# Patient Record
Sex: MALE | Race: AMERICAN INDIAN OR ALASKA NATIVE | Employment: OTHER | URBAN - METROPOLITAN AREA
[De-identification: names, ages, dates, MRNs, and addresses within clinical notes are randomized per-mention and may not be internally consistent; named-entity substitution may affect disease eponyms.]

---

## 2018-07-18 ENCOUNTER — HOSPITAL ENCOUNTER (EMERGENCY)
Age: 58
Discharge: HOME | End: 2018-07-18
Attending: EMERGENCY MEDICINE
Payer: COMMERCIAL

## 2018-07-18 ENCOUNTER — APPOINTMENT (OUTPATIENT)
Dept: CT IMAGING | Age: 58
End: 2018-07-18
Attending: PHYSICIAN ASSISTANT
Payer: COMMERCIAL

## 2018-07-18 VITALS
HEART RATE: 70 BPM | SYSTOLIC BLOOD PRESSURE: 163 MMHG | DIASTOLIC BLOOD PRESSURE: 99 MMHG | WEIGHT: 191.36 LBS | HEIGHT: 69 IN | RESPIRATION RATE: 16 BRPM | TEMPERATURE: 98.8 F | OXYGEN SATURATION: 97 % | BODY MASS INDEX: 28.34 KG/M2

## 2018-07-18 DIAGNOSIS — R51.9 ACUTE INTRACTABLE HEADACHE, UNSPECIFIED HEADACHE TYPE: Primary | ICD-10-CM

## 2018-07-18 DIAGNOSIS — J06.9 ACUTE UPPER RESPIRATORY INFECTION: ICD-10-CM

## 2018-07-18 DIAGNOSIS — I10 ESSENTIAL HYPERTENSION: ICD-10-CM

## 2018-07-18 DIAGNOSIS — F17.200 TOBACCO DEPENDENCE: ICD-10-CM

## 2018-07-18 PROCEDURE — 74011250636 HC RX REV CODE- 250/636: Performed by: PHYSICIAN ASSISTANT

## 2018-07-18 PROCEDURE — 99283 EMERGENCY DEPT VISIT LOW MDM: CPT

## 2018-07-18 PROCEDURE — 96374 THER/PROPH/DIAG INJ IV PUSH: CPT

## 2018-07-18 PROCEDURE — 70450 CT HEAD/BRAIN W/O DYE: CPT

## 2018-07-18 PROCEDURE — 96375 TX/PRO/DX INJ NEW DRUG ADDON: CPT

## 2018-07-18 RX ORDER — KETOROLAC TROMETHAMINE 30 MG/ML
30 INJECTION, SOLUTION INTRAMUSCULAR; INTRAVENOUS
Status: COMPLETED | OUTPATIENT
Start: 2018-07-18 | End: 2018-07-18

## 2018-07-18 RX ORDER — PROCHLORPERAZINE EDISYLATE 5 MG/ML
10 INJECTION INTRAMUSCULAR; INTRAVENOUS
Status: COMPLETED | OUTPATIENT
Start: 2018-07-18 | End: 2018-07-18

## 2018-07-18 RX ORDER — DIPHENHYDRAMINE HYDROCHLORIDE 50 MG/ML
50 INJECTION, SOLUTION INTRAMUSCULAR; INTRAVENOUS
Status: COMPLETED | OUTPATIENT
Start: 2018-07-18 | End: 2018-07-18

## 2018-07-18 RX ORDER — BUTALBITAL, ACETAMINOPHEN AND CAFFEINE 300; 40; 50 MG/1; MG/1; MG/1
CAPSULE ORAL
Qty: 20 CAP | Refills: 0 | Status: ON HOLD | OUTPATIENT
Start: 2018-07-18 | End: 2021-08-20 | Stop reason: SINTOL

## 2018-07-18 RX ADMIN — DIPHENHYDRAMINE HYDROCHLORIDE 50 MG: 50 INJECTION, SOLUTION INTRAMUSCULAR; INTRAVENOUS at 19:42

## 2018-07-18 RX ADMIN — KETOROLAC TROMETHAMINE 30 MG: 30 INJECTION, SOLUTION INTRAMUSCULAR at 19:42

## 2018-07-18 RX ADMIN — PROCHLORPERAZINE EDISYLATE 10 MG: 5 INJECTION INTRAMUSCULAR; INTRAVENOUS at 19:42

## 2018-07-18 NOTE — ED NOTES
Patient to triage room to reassess VS and provide update to patient and family. Patient understanding at this time. VSS, will remain in waiting room.

## 2018-07-18 NOTE — LETTER
[address] 
[facility] EMERGENCY DEPT 
[address] [address] 
[phone] Work/School Note Date: 7/18/2018 To Whom It May concern: 
 
Ceferino Rossi was seen and treated today in the emergency room. He may return to work in 1 to 2 days, as symptoms improve. Sincerely, Solitario Milligan

## 2018-07-18 NOTE — ED TRIAGE NOTES
Pt with c/o of left temporal headache that migrates up to top of head for about 2 weeks. States that treated about 2 weeks ago by PMD for URI and is still taking abx. States not taking pain med. Wife at bedside. States occasionally pt talks like he has \"marbles in his mouth\". States that his speech is different, slower. Speech appears forced. PA at bedside.

## 2018-07-18 NOTE — ED PROVIDER NOTES
EMERGENCY DEPARTMENT HISTORY AND PHYSICAL EXAM      Date: 7/18/2018  Patient Name: Charles Zheng    History of Presenting Illness     Chief Complaint   Patient presents with    Headache     Pt ambulatory to triage with c/o headache, L-sided x 2 weeks; pt was seen at [address] [place] [address] on Friday for \"vomiting and headache\"; pt was referred by PCP for CT scan; pt states nausea, no vomiting; denies hx of migraines, vertigo       History Provided By: Patient and Patient's Wife    HPI: Charles Zheng, 60 y.[facility]. [facility] with his wife with c/o headache, N/V x 2 weeks. Pt states he was seen at [facility] on Friday, 7/13/18; however, symptoms persist.  Pt follow up with his PCP today who referred to the ED for CT scan. Pt denied head injury. No h/o chronic headaches or migraines. Pt without sinus pressure/congestion but c/o fullness to ears and is currently being treated for respiratory illness with Ceftin. Pt without neck pain or stiffness. No fever/chills. No rashes, insect bites/stings. Pt denied visual changes. Pt denied dizziness/vertigo however, wife reports he seems \"slow and wobbly\". Chief Complaint: headache, N/V  Duration: 2 Weeks  Timing:  Progressive  Location: L sided headache  Quality: Sharp  Severity: 10 out of 10  Modifying Factors: no exacerbating/alleviating features  Associated Symptoms: denies any other associated signs or symptoms        There are no other complaints, changes, or physical findings at this time.     PCP: Rita Chirinos MD    Current Facility-Administered Medications   Medication Dose Route Frequency Provider Last Rate Last Dose    prochlorperazine (COMPAZINE) injection 10 mg  10 mg IntraVENous NOW [facility] [place]        diphenhydrAMINE (BENADRYL) injection 50 mg  50 mg IntraVENous NOW [facility] [place]        ketorolac (TORADOL) injection 30 mg  30 mg IntraVENous NOW [facility]         Current Outpatient Prescriptions   Medication Sig Dispense Refill    olmesartan (BENICAR) 20 mg tablet Take 20 mg by mouth daily.  fexofenadine (ALLEGRA) 180 mg tablet Take 60 mg by mouth daily.  HYDROmorphone (DILAUDID) 2 mg tablet Take 2 mg by mouth every six (6) hours as needed.  ciprofloxacin (CIPRO) 500 mg tablet Take 500 mg by mouth two (2) times a day.  ibuprofen (MOTRIN) 200 mg tablet Take 400 mg by mouth every six (6) hours as needed.  potassium chloride (KLOR-CON M20) 20 mEq tablet Take 20 mEq by mouth two (2) times a day.  omeprazole (PRILOSEC) 40 mg capsule Take 40 mg by mouth daily. Past History     Past Medical History:  Past Medical History:   Diagnosis Date    Hypertension        Past Surgical History:  Past Surgical History:   Procedure Laterality Date    ABDOMEN SURGERY PROC UNLISTED      hernia       Family History:  Family History   Problem Relation Age of Onset    Heart Attack Mother        Social History:  Social History   Substance Use Topics    Smoking status: Current Every Day Smoker     Packs/day: 1.00    Smokeless tobacco: None    Alcohol use No       Allergies:  No Known Allergies      Review of Systems   Review of Systems   Constitutional: Negative for chills and fever. HENT: Positive for ear pain. Negative for congestion, postnasal drip, rhinorrhea, sinus pain, sinus pressure and sore throat. Eyes: Negative for photophobia and visual disturbance. Respiratory: Negative for cough and shortness of breath. Cardiovascular: Negative for chest pain and palpitations. Gastrointestinal: Negative for abdominal pain, diarrhea, nausea and vomiting. Endocrine: Negative for polydipsia, polyphagia and polyuria. Genitourinary: Negative for dysuria and hematuria. Musculoskeletal: Negative for neck pain and neck stiffness. Skin: Negative for rash and wound. Allergic/Immunologic: Negative for environmental allergies, food allergies and immunocompromised state.    Neurological: Negative for dizziness, facial asymmetry, speech difficulty, weakness, numbness and headaches. See HPI   Psychiatric/Behavioral: Negative for agitation and confusion. The patient is nervous/anxious. Physical Exam   Physical Exam   Constitutional: He is oriented to person, place, and time. He appears well-developed and well-nourished. No distress. WDWN male, alert, in moderate discomfort   HENT:   Head: Normocephalic and atraumatic. Mouth/Throat: No oropharyngeal exudate. Boggy nasal mucosa, clear rhinorrhea, posterior oropharynx injected without exudate. Increased effusion noted to bilat TMs, no erythema, good light reflex noted. Eyes: Conjunctivae and EOM are normal. Pupils are equal, round, and reactive to light. Right eye exhibits no discharge. Left eye exhibits no discharge. No scleral icterus. Neck: Normal range of motion. Neck supple. No JVD present. No tracheal deviation present. No thyromegaly present. No nuchal rigidity   Cardiovascular: Normal rate, regular rhythm and normal heart sounds. Pulmonary/Chest: Effort normal and breath sounds normal. No respiratory distress. He has no wheezes. He exhibits no tenderness. Abdominal: Soft. He exhibits no distension. There is no tenderness. There is no rebound and no guarding. Musculoskeletal: Normal range of motion. He exhibits no edema or tenderness.  5/5 bilat, pt observed to ambulate without deficit   Lymphadenopathy:     He has no cervical adenopathy. Neurological: He is alert and oriented to person, place, and time. He has normal reflexes. No cranial nerve deficit. He exhibits normal muscle tone. Coordination normal.   FNF intact, no pronator drift   Skin: Skin is warm and dry. No rash noted. He is not diaphoretic. No erythema. No pallor. Psychiatric: He has a normal mood and affect. His behavior is normal. Judgment normal.   Nursing note and vitals reviewed.       Diagnostic Study Results     Labs -   No results found for this or any previous visit (from the past 12 hour(s)). Radiologic Studies -   CT HEAD WO CONT    (Results Pending)        Abbie Pack #[id number]  (58 y.o. M) 25             Lab Results      None       Imaging Results           CT HEAD WO CONT (Final result) Result time: 07/18/18 19:28:06     Final result by Jarret Gallo Results In (07/18/18 19:27:54)     Initial Result:     Impression:     IMPRESSION: No acute findings.         Narrative:     EXAM:  CT HEAD WO CONT    INDICATION:   Headache    COMPARISON: None. CONTRAST:  None. TECHNIQUE: Unenhanced CT of the head was performed using 5 mm images. Brain and  bone windows were generated.  CT dose reduction was achieved through use of a  standardized protocol tailored for this examination and automatic exposure  control for dose modulation.      FINDINGS:  The ventricles and sulci are normal in size, shape and configuration and  midline. There is no significant white matter disease. There is no intracranial  hemorrhage, extra-axial collection, mass, mass effect or midline shift.  The  basilar cisterns are open. No acute infarct is identified. The bone windows  demonstrate no abnormalities. The visualized portions of the paranasal sinuses  and mastoid air cells are clear except for a small mucosal retention cyst  inferiorly in the left maxillary sinus. .                    Medical Decision Making   I am the first provider for this patient. I reviewed the vital signs, available nursing notes, past medical history, past surgical history, family history and social history. Vital Signs-Reviewed the patient's vital signs.   Patient Vitals for the past 12 hrs:   Temp Pulse Resp BP SpO2   07/18/18 1758 - 70 16 (!) 163/99 97 %   07/18/18 1553 98.8 °F (37.1 °C) 78 16 (!) 142/97 96 %       Records Reviewed: Nursing Notes, Old Medical Records and Previous Laboratory Studies    Provider Notes (Medical Decision Making):   Sinusitis, tension headache, temporal arteritis, [facility]    ED Course:   Initial assessment performed. The patients presenting problems have been discussed, and they are in agreement with the care plan formulated and outlined with them. I have encouraged them to ask questions as they arise throughout their visit. DISCHARGE NOTE:  1910  The care plan has been outline with the patient and/or family, who verbally conveyed understanding and agreement. Available results have been reviewed. Patient and/or family understand the follow up plan as outlined and discharge instructions. Should their condition deterioration at any time after discharge the patient agrees to return, follow up sooner than outlined or seek medical assistance at the closest Emergency Room as soon as possible. Questions have been answered. Discharge instructions and educational information regarding the patient's diagnosis as well a list of reasons why the patient would want to seek immediate medical attention, should their condition change, were reviewed directly with the patient/family       PLAN:  1. Discharge Medication List as of 7/18/2018  8:36 PM        2. Follow-up Information     Follow up With Details Comments 173 Chuck Hurtado MD   [address]  [phone]      [facility] EMERGENCY DEPT  If symptoms worsen [address] [facility] [address]  [phone]        Return to ED if worse     Diagnosis     Clinical Impression:   1. Acute intractable headache, unspecified headache type    2. Acute upper respiratory infection    3. Essential hypertension    4.  Tobacco dependence

## 2018-07-19 NOTE — DISCHARGE INSTRUCTIONS
Headache: Care Instructions  Your Care Instructions    Headaches have many possible causes. Most headaches aren't a sign of a more serious problem, and they will get better on their own. Home treatment may help you feel better faster. The doctor has checked you carefully, but problems can develop later. If you notice any problems or new symptoms, get medical treatment right away. Follow-up care is a key part of your treatment and safety. Be sure to make and go to all appointments, and call your doctor if you are having problems. It's also a good idea to know your test results and keep a list of the medicines you take. How can you care for yourself at home? · Do not drive if you have taken a prescription pain medicine. · Rest in a quiet, dark room until your headache is gone. Close your eyes and try to relax or go to sleep. Don't watch TV or read. · Put a cold, moist cloth or cold pack on the painful area for 10 to 20 minutes at a time. Put a thin cloth between the cold pack and your skin. · Use a warm, moist towel or a heating pad set on low to relax tight shoulder and neck muscles. · Have someone gently massage your neck and shoulders. · Take pain medicines exactly as directed. ¨ If the doctor gave you a prescription medicine for pain, take it as prescribed. ¨ If you are not taking a prescription pain medicine, ask your doctor if you can take an over-the-counter medicine. · Be careful not to take pain medicine more often than the instructions allow, because you may get worse or more frequent headaches when the medicine wears off. · Do not ignore new symptoms that occur with a headache, such as a fever, weakness or numbness, vision changes, or confusion. These may be signs of a more serious problem. To prevent headaches  · Keep a headache diary so you can figure out what triggers your headaches. Avoiding triggers may help you prevent headaches.  Record when each headache began, how long it lasted, and what the pain was like (throbbing, aching, stabbing, or dull). Write down any other symptoms you had with the headache, such as nausea, flashing lights or dark spots, or sensitivity to bright light or loud noise. Note if the headache occurred near your period. List anything that might have triggered the headache, such as certain foods (chocolate, cheese, wine) or odors, smoke, bright light, stress, or lack of sleep. · Find healthy ways to deal with stress. Headaches are most common during or right after stressful times. Take time to relax before and after you do something that has caused a headache in the past.  · Try to keep your muscles relaxed by keeping good posture. Check your jaw, face, neck, and shoulder muscles for tension, and try relaxing them. When sitting at a desk, change positions often, and stretch for 30 seconds each hour. · Get plenty of sleep and exercise. · Eat regularly and well. Long periods without food can trigger a headache. · Treat yourself to a massage. Some people find that regular massages are very helpful in relieving tension. · Limit caffeine by not drinking too much coffee, tea, or soda. But don't quit caffeine suddenly, because that can also give you headaches. · Reduce eyestrain from computers by blinking frequently and looking away from the computer screen every so often. Make sure you have proper eyewear and that your monitor is set up properly, about an arm's length away. · Seek help if you have depression or anxiety. Your headaches may be linked to these conditions. Treatment can both prevent headaches and help with symptoms of anxiety or depression. When should you call for help? Call 911 anytime you think you may need emergency care. For example, call if:    · You have signs of a stroke. These may include:  ¨ Sudden numbness, paralysis, or weakness in your face, arm, or leg, especially on only one side of your body. ¨ Sudden vision changes.   ¨ Sudden trouble speaking. ¨ Sudden confusion or trouble understanding simple statements. ¨ Sudden problems with walking or balance. ¨ A sudden, severe headache that is different from past headaches.    Call your doctor now or seek immediate medical care if:    · You have a new or worse headache.     · Your headache gets much worse. Where can you learn more? Go to http://juliana-juan jose.info/. Enter M271 in the search box to learn more about \"Headache: Care Instructions. \"  Current as of: October 9, 2017  Content Version: 11.7  © 4085-1533 [employer]. Care instructions adapted under license by [employer] (which disclaims liability or warranty for this information). If you have questions about a medical condition or this instruction, always ask your healthcare professional. [address] any warranty or liability for your use of this information. Learning About High Blood Pressure  What is high blood pressure? Blood pressure is a measure of how hard the blood pushes against the walls of your arteries. It's normal for blood pressure to go up and down throughout the day, but if it stays up, you have high blood pressure. Another name for high blood pressure is hypertension. Two numbers tell you your blood pressure. The first number is the systolic pressure. It shows how hard the blood pushes when your heart is pumping. The second number is the diastolic pressure. It shows how hard the blood pushes between heartbeats, when your heart is relaxed and filling with blood. A blood pressure of less than 120/80 (say \"120 over 80\") is ideal for an adult. High blood pressure is 130/80 or higher. You have high blood pressure if your top number is 130 or higher or your bottom number is 80 or higher, or both. What happens when you have high blood pressure? · Blood flows through your arteries with too much force. Over time, this damages the walls of your arteries. But you can't feel it. High blood pressure usually doesn't cause symptoms. · Fat and calcium start to build up in your arteries. This buildup is called plaque. Plaque makes your arteries narrower and stiffer. Blood can't flow through them as easily. · This lack of good blood flow starts to damage some of the organs in your body. This can lead to problems such as coronary artery disease and heart attack, heart failure, stroke, kidney failure, and eye damage. How can you prevent high blood pressure? · Stay at a healthy weight. · Try to limit how much sodium you eat to less than 2,300 milligrams (mg) a day. If you limit your sodium to 1,500 mg a day, you can lower your blood pressure even more. ¨ Buy foods that are labeled \"unsalted,\" \"sodium-free,\" or \"low-sodium. \" Foods labeled \"reduced-sodium\" and \"light sodium\" may still have too much sodium. ¨ Flavor your food with garlic, lemon juice, onion, vinegar, herbs, and spices instead of salt. Do not use soy sauce, steak sauce, onion salt, garlic salt, mustard, or ketchup on your food. ¨ Use less salt (or none) when recipes call for it. You can often use half the salt a recipe calls for without losing flavor. · Be physically active. Get at least 30 minutes of exercise on most days of the week. Walking is a good choice. You also may want to do other activities, such as running, swimming, cycling, or playing tennis or team sports. · Limit alcohol to 2 drinks a day for men and 1 drink a day for women. · Eat plenty of fruits, vegetables, and low-fat dairy products. Eat less saturated and total fats. How is high blood pressure treated? · Your doctor will suggest making lifestyle changes. For example, your doctor may ask you to eat healthy foods, quit smoking, lose extra weight, and be more active. · If lifestyle changes don't help enough or your blood pressure is very high, you will have to take medicine every day.   Follow-up care is a key part of your treatment and safety. Be sure to make and go to all appointments, and call your doctor if you are having problems. It's also a good idea to know your test results and keep a list of the medicines you take. Where can you learn more? Go to http://juliana-juan jose.info/. Enter P501 in the search box to learn more about \"Learning About High Blood Pressure. \"  Current as of: May 10, 2017  Content Version: 11.7  © 4445-0594 [email or web address]. Care instructions adapted under license by [employer] (which disclaims liability or warranty for this information). If you have questions about a medical condition or this instruction, always ask your healthcare professional. [address] any warranty or liability for your use of this information. Learning About Benefits From Quitting Smoking  How does quitting smoking make you healthier? If you're thinking about quitting smoking, you may have a few reasons to be smoke-free. Your health may be one of them. · When you quit smoking, you lower your risks for cancer, lung disease, heart attack, stroke, blood vessel disease, and blindness from macular degeneration. · When you're smoke-free, you get sick less often, and you heal faster. You are less likely to get colds, flu, bronchitis, and pneumonia. · As a nonsmoker, you may find that your mood is better and you are less stressed. When and how will you feel healthier? Quitting has real health benefits that start from day 1 of being smoke-free. And the longer you stay smoke-free, the healthier you get and the better you feel. The first hours  · After just 20 minutes, your blood pressure and heart rate go down. That means there's less stress on your heart and blood vessels. · Within 12 hours, the level of carbon monoxide in your blood drops back to normal. That makes room for more oxygen.  With more oxygen in your body, you may notice that you have more energy than when you smoked. After 2 weeks  · Your lungs start to work better. · Your risk of heart attack starts to drop. After 1 month  · When your lungs are clear, you cough less and breathe deeper, so it's easier to be active. · Your sense of taste and smell return. That means you can enjoy food more than you have since you started smoking. Over the years  · After 1 year, your risk of heart disease is half what it would be if you kept smoking. · After 5 years, your risk of stroke starts to shrink. Within a few years after that, it's about the same as if you'd never smoked. · After 10 years, your risk of dying from lung cancer is cut by about half. And your risk for many other types of cancer is lower too. How would quitting help others in your life? When you quit smoking, you improve the health of everyone who now breathes in your smoke. · Their heart, lung, and cancer risks drop, much like yours. · They are sick less. For babies and small children, living smoke-free means they're less likely to have ear infections, pneumonia, and bronchitis. · If you're a woman who is or will be pregnant someday, quitting smoking means a healthier . · Children who are close to you are less likely to become adult smokers. Where can you learn more? Go to http://juliana-juan jose.info/. Enter 052 [id number] [id number] 11 in the search box to learn more about \"Learning About Benefits From Quitting Smoking. \"  Current as of: 2017  Content Version: 11.7  © 5260-5316 [employer]. Care instructions adapted under license by [employer] (which disclaims liability or warranty for this information). If you have questions about a medical condition or this instruction, always ask your healthcare professional. [place] [address] any warranty or liability for your use of this information.

## 2021-08-19 ENCOUNTER — APPOINTMENT (OUTPATIENT)
Dept: ULTRASOUND IMAGING | Age: 61
DRG: 246 | End: 2021-08-19
Attending: STUDENT IN AN ORGANIZED HEALTH CARE EDUCATION/TRAINING PROGRAM
Payer: COMMERCIAL

## 2021-08-19 ENCOUNTER — HOSPITAL ENCOUNTER (INPATIENT)
Age: 61
LOS: 3 days | Discharge: HOME | DRG: 246 | End: 2021-08-22
Attending: EMERGENCY MEDICINE | Admitting: STUDENT IN AN ORGANIZED HEALTH CARE EDUCATION/TRAINING PROGRAM
Payer: COMMERCIAL

## 2021-08-19 ENCOUNTER — APPOINTMENT (OUTPATIENT)
Dept: GENERAL RADIOLOGY | Age: 61
DRG: 246 | End: 2021-08-19
Attending: EMERGENCY MEDICINE
Payer: COMMERCIAL

## 2021-08-19 DIAGNOSIS — I21.4 NSTEMI (NON-ST ELEVATED MYOCARDIAL INFARCTION) (HCC): Primary | ICD-10-CM

## 2021-08-19 DIAGNOSIS — R07.9 CHEST PAIN, UNSPECIFIED TYPE: ICD-10-CM

## 2021-08-19 LAB
ALBUMIN SERPL-MCNC: 3.8 G/DL (ref 3.5–5)
ALBUMIN/GLOB SERPL: 1 {RATIO} (ref 1.1–2.2)
ALP SERPL-CCNC: 74 U/L (ref 45–117)
ALT SERPL-CCNC: 87 U/L (ref 12–78)
ANION GAP SERPL CALC-SCNC: 7 MMOL/L (ref 5–15)
APTT PPP: 25.9 SEC (ref 22.1–31)
AST SERPL-CCNC: 65 U/L (ref 15–37)
BASOPHILS # BLD: 0.1 K/UL (ref 0–0.1)
BASOPHILS NFR BLD: 1 % (ref 0–1)
BILIRUB SERPL-MCNC: 0.7 MG/DL (ref 0.2–1)
BUN SERPL-MCNC: 12 MG/DL (ref 6–20)
BUN/CREAT SERPL: 11 (ref 12–20)
CALCIUM SERPL-MCNC: 9.1 MG/DL (ref 8.5–10.1)
CHLORIDE SERPL-SCNC: 106 MMOL/L (ref 97–108)
CK MB CFR SERPL CALC: 4.1 % (ref 0–2.5)
CK MB SERPL-MCNC: 19.8 NG/ML (ref 5–25)
CK SERPL-CCNC: 480 U/L (ref 39–308)
CO2 SERPL-SCNC: 25 MMOL/L (ref 21–32)
COMMENT, HOLDF: NORMAL
CREAT SERPL-MCNC: 1.05 MG/DL (ref 0.7–1.3)
DIFFERENTIAL METHOD BLD: ABNORMAL
EOSINOPHIL # BLD: 0.4 K/UL (ref 0–0.4)
EOSINOPHIL NFR BLD: 5 % (ref 0–7)
ERYTHROCYTE [DISTWIDTH] IN BLOOD BY AUTOMATED COUNT: 13.8 % (ref 11.5–14.5)
GLOBULIN SER CALC-MCNC: 3.7 G/DL (ref 2–4)
GLUCOSE SERPL-MCNC: 147 MG/DL (ref 65–100)
HCT VFR BLD AUTO: 47.8 % (ref 36.6–50.3)
HGB BLD-MCNC: 15.6 G/DL (ref 12.1–17)
IMM GRANULOCYTES # BLD AUTO: 0.1 K/UL (ref 0–0.04)
IMM GRANULOCYTES NFR BLD AUTO: 1 % (ref 0–0.5)
INR PPP: 1 (ref 0.9–1.1)
LYMPHOCYTES # BLD: 1.4 K/UL (ref 0.8–3.5)
LYMPHOCYTES NFR BLD: 18 % (ref 12–49)
MCH RBC QN AUTO: 28.1 PG (ref 26–34)
MCHC RBC AUTO-ENTMCNC: 32.6 G/DL (ref 30–36.5)
MCV RBC AUTO: 86.1 FL (ref 80–99)
MONOCYTES # BLD: 0.6 K/UL (ref 0–1)
MONOCYTES NFR BLD: 7 % (ref 5–13)
NEUTS SEG # BLD: 5.4 K/UL (ref 1.8–8)
NEUTS SEG NFR BLD: 68 % (ref 32–75)
NRBC # BLD: 0 K/UL (ref 0–0.01)
NRBC BLD-RTO: 0 PER 100 WBC
PLATELET # BLD AUTO: 208 K/UL (ref 150–400)
PMV BLD AUTO: 10.3 FL (ref 8.9–12.9)
POTASSIUM SERPL-SCNC: 3.6 MMOL/L (ref 3.5–5.1)
PROT SERPL-MCNC: 7.5 G/DL (ref 6.4–8.2)
PROTHROMBIN TIME: 10.6 SEC (ref 9–11.1)
RBC # BLD AUTO: 5.55 M/UL (ref 4.1–5.7)
SAMPLES BEING HELD,HOLD: NORMAL
SODIUM SERPL-SCNC: 138 MMOL/L (ref 136–145)
THERAPEUTIC RANGE,PTTT: NORMAL SECS (ref 58–77)
TROPONIN I BLD-MCNC: 0.45 NG/ML (ref 0–0.08)
TROPONIN I SERPL-MCNC: 1.66 NG/ML
TROPONIN I SERPL-MCNC: 25.2 NG/ML
WBC # BLD AUTO: 7.9 K/UL (ref 4.1–11.1)

## 2021-08-19 PROCEDURE — 80053 COMPREHEN METABOLIC PANEL: CPT

## 2021-08-19 PROCEDURE — 74011250636 HC RX REV CODE- 250/636: Performed by: STUDENT IN AN ORGANIZED HEALTH CARE EDUCATION/TRAINING PROGRAM

## 2021-08-19 PROCEDURE — 85610 PROTHROMBIN TIME: CPT

## 2021-08-19 PROCEDURE — 82550 ASSAY OF CK (CPK): CPT

## 2021-08-19 PROCEDURE — 93005 ELECTROCARDIOGRAM TRACING: CPT

## 2021-08-19 PROCEDURE — 74011250636 HC RX REV CODE- 250/636: Performed by: EMERGENCY MEDICINE

## 2021-08-19 PROCEDURE — 82553 CREATINE MB FRACTION: CPT

## 2021-08-19 PROCEDURE — 76775 US EXAM ABDO BACK WALL LIM: CPT

## 2021-08-19 PROCEDURE — 65660000000 HC RM CCU STEPDOWN

## 2021-08-19 PROCEDURE — 84484 ASSAY OF TROPONIN QUANT: CPT

## 2021-08-19 PROCEDURE — 96375 TX/PRO/DX INJ NEW DRUG ADDON: CPT

## 2021-08-19 PROCEDURE — 96374 THER/PROPH/DIAG INJ IV PUSH: CPT

## 2021-08-19 PROCEDURE — 71045 X-RAY EXAM CHEST 1 VIEW: CPT

## 2021-08-19 PROCEDURE — 36415 COLL VENOUS BLD VENIPUNCTURE: CPT

## 2021-08-19 PROCEDURE — 74011250636 HC RX REV CODE- 250/636: Performed by: INTERNAL MEDICINE

## 2021-08-19 PROCEDURE — 74011000250 HC RX REV CODE- 250: Performed by: STUDENT IN AN ORGANIZED HEALTH CARE EDUCATION/TRAINING PROGRAM

## 2021-08-19 PROCEDURE — 85025 COMPLETE CBC W/AUTO DIFF WBC: CPT

## 2021-08-19 PROCEDURE — 74011250637 HC RX REV CODE- 250/637: Performed by: INTERNAL MEDICINE

## 2021-08-19 PROCEDURE — 74011000250 HC RX REV CODE- 250: Performed by: INTERNAL MEDICINE

## 2021-08-19 PROCEDURE — 99285 EMERGENCY DEPT VISIT HI MDM: CPT

## 2021-08-19 PROCEDURE — 85730 THROMBOPLASTIN TIME PARTIAL: CPT

## 2021-08-19 RX ORDER — HEPARIN SODIUM 5000 [USP'U]/ML
2000 INJECTION, SOLUTION INTRAVENOUS; SUBCUTANEOUS AS NEEDED
Status: DISCONTINUED | OUTPATIENT
Start: 2021-08-20 | End: 2021-08-20 | Stop reason: SDUPTHER

## 2021-08-19 RX ORDER — MORPHINE SULFATE 2 MG/ML
2 INJECTION, SOLUTION INTRAMUSCULAR; INTRAVENOUS
Status: COMPLETED | OUTPATIENT
Start: 2021-08-19 | End: 2021-08-19

## 2021-08-19 RX ORDER — PANTOPRAZOLE SODIUM 40 MG/1
40 TABLET, DELAYED RELEASE ORAL
Status: DISCONTINUED | OUTPATIENT
Start: 2021-08-20 | End: 2021-08-22 | Stop reason: HOSPADM

## 2021-08-19 RX ORDER — ACETAMINOPHEN 325 MG/1
650 TABLET ORAL
Status: DISCONTINUED | OUTPATIENT
Start: 2021-08-19 | End: 2021-08-22 | Stop reason: HOSPADM

## 2021-08-19 RX ORDER — MORPHINE SULFATE 2 MG/ML
2 INJECTION, SOLUTION INTRAMUSCULAR; INTRAVENOUS ONCE
Status: COMPLETED | OUTPATIENT
Start: 2021-08-19 | End: 2021-08-19

## 2021-08-19 RX ORDER — GUAIFENESIN 100 MG/5ML
81 LIQUID (ML) ORAL DAILY
Status: DISCONTINUED | OUTPATIENT
Start: 2021-08-20 | End: 2021-08-22 | Stop reason: HOSPADM

## 2021-08-19 RX ORDER — HEPARIN SODIUM 10000 [USP'U]/100ML
10-25 INJECTION, SOLUTION INTRAVENOUS
Status: DISCONTINUED | OUTPATIENT
Start: 2021-08-19 | End: 2021-08-20

## 2021-08-19 RX ORDER — METOPROLOL TARTRATE 25 MG/1
25 TABLET, FILM COATED ORAL 2 TIMES DAILY
Status: DISCONTINUED | OUTPATIENT
Start: 2021-08-19 | End: 2021-08-22 | Stop reason: HOSPADM

## 2021-08-19 RX ORDER — SODIUM CHLORIDE 9 MG/ML
75 INJECTION, SOLUTION INTRAVENOUS ONCE
Status: COMPLETED | OUTPATIENT
Start: 2021-08-19 | End: 2021-08-19

## 2021-08-19 RX ORDER — HEPARIN SODIUM 5000 [USP'U]/ML
4000 INJECTION, SOLUTION INTRAVENOUS; SUBCUTANEOUS AS NEEDED
Status: DISCONTINUED | OUTPATIENT
Start: 2021-08-20 | End: 2021-08-20 | Stop reason: SDUPTHER

## 2021-08-19 RX ORDER — NITROGLYCERIN 20 MG/100ML
5-100 INJECTION INTRAVENOUS
Status: DISCONTINUED | OUTPATIENT
Start: 2021-08-19 | End: 2021-08-20

## 2021-08-19 RX ORDER — SODIUM CHLORIDE 0.9 % (FLUSH) 0.9 %
5-40 SYRINGE (ML) INJECTION AS NEEDED
Status: DISCONTINUED | OUTPATIENT
Start: 2021-08-19 | End: 2021-08-20 | Stop reason: SDUPTHER

## 2021-08-19 RX ORDER — NITROGLYCERIN 0.4 MG/1
0.4 TABLET SUBLINGUAL
Status: DISCONTINUED | OUTPATIENT
Start: 2021-08-19 | End: 2021-08-22 | Stop reason: HOSPADM

## 2021-08-19 RX ORDER — ONDANSETRON 4 MG/1
4 TABLET, ORALLY DISINTEGRATING ORAL
Status: DISCONTINUED | OUTPATIENT
Start: 2021-08-19 | End: 2021-08-22 | Stop reason: HOSPADM

## 2021-08-19 RX ORDER — POLYETHYLENE GLYCOL 3350 17 G/17G
17 POWDER, FOR SOLUTION ORAL DAILY PRN
Status: DISCONTINUED | OUTPATIENT
Start: 2021-08-19 | End: 2021-08-22 | Stop reason: HOSPADM

## 2021-08-19 RX ORDER — ACETAMINOPHEN 650 MG/1
650 SUPPOSITORY RECTAL
Status: DISCONTINUED | OUTPATIENT
Start: 2021-08-19 | End: 2021-08-22 | Stop reason: HOSPADM

## 2021-08-19 RX ORDER — ONDANSETRON 2 MG/ML
4 INJECTION INTRAMUSCULAR; INTRAVENOUS
Status: DISCONTINUED | OUTPATIENT
Start: 2021-08-19 | End: 2021-08-22 | Stop reason: HOSPADM

## 2021-08-19 RX ORDER — SODIUM CHLORIDE 0.9 % (FLUSH) 0.9 %
5-40 SYRINGE (ML) INJECTION EVERY 8 HOURS
Status: DISCONTINUED | OUTPATIENT
Start: 2021-08-19 | End: 2021-08-20 | Stop reason: SDUPTHER

## 2021-08-19 RX ADMIN — MORPHINE SULFATE 2 MG: 2 INJECTION, SOLUTION INTRAMUSCULAR; INTRAVENOUS at 20:44

## 2021-08-19 RX ADMIN — NITROGLYCERIN 20 MCG/MIN: 20 INJECTION INTRAVENOUS at 22:20

## 2021-08-19 RX ADMIN — NITROGLYCERIN 0.4 MG: 0.4 TABLET, ORALLY DISINTEGRATING SUBLINGUAL at 21:42

## 2021-08-19 RX ADMIN — NITROGLYCERIN 10 MCG/MIN: 20 INJECTION INTRAVENOUS at 22:19

## 2021-08-19 RX ADMIN — SODIUM CHLORIDE 75 ML/HR: 9 INJECTION, SOLUTION INTRAVENOUS at 22:15

## 2021-08-19 RX ADMIN — HEPARIN SODIUM 10 UNITS/KG/HR: 10000 INJECTION, SOLUTION INTRAVENOUS at 21:00

## 2021-08-19 RX ADMIN — MORPHINE SULFATE 2 MG: 2 INJECTION, SOLUTION INTRAMUSCULAR; INTRAVENOUS at 23:51

## 2021-08-19 RX ADMIN — Medication 10 ML: at 23:52

## 2021-08-19 NOTE — ED NOTES
Assumed care of pt from EMS. EMS was called when the pt started experiencing sudden chest pain while he was eating dinner. Pt took 324mg of aspirin. Pt is complaining of numbness in both arms. EMS did call a STEMI pre alert and advised of ST elevation in leads 2, 3 , AVF. Cardiology and cath lab was paged. EMS administered 100mcg of fentanyl and 4mg zofran. Hx of HTN. EMS reported stable vitals during transport. Cardiology, ER MD at bedside evaluating pt. Pt on monitor x3. Call light in reach. [id number] Per cardiology, pt will not go to cath lab at this time. 1944  Bedside and Verbal shift change report given to Temo (oncoming nurse) by Lisseth Nugent (offgoing nurse). Report included the following information SBAR and ED Summary.

## 2021-08-19 NOTE — Clinical Note
TRANSFER - OUT REPORT:     Verbal report given to: Yaquelin. Report consisted of patient's Situation, Background, Assessment and   Recommendations(SBAR). Opportunity for questions and clarification was provided. Patient transported with a Registered Nurse. Patient transported to: 2160.

## 2021-08-20 ENCOUNTER — TRANSCRIBE ORDER (OUTPATIENT)
Dept: CARDIAC REHAB | Age: 61
End: 2021-08-20

## 2021-08-20 ENCOUNTER — APPOINTMENT (OUTPATIENT)
Dept: NON INVASIVE DIAGNOSTICS | Age: 61
DRG: 246 | End: 2021-08-20
Attending: STUDENT IN AN ORGANIZED HEALTH CARE EDUCATION/TRAINING PROGRAM
Payer: COMMERCIAL

## 2021-08-20 DIAGNOSIS — I21.4 NSTEMI (NON-ST ELEVATION MYOCARDIAL INFARCTION) (HCC): Primary | ICD-10-CM

## 2021-08-20 LAB
ACT BLD: 175 SECS (ref 79–138)
ACT BLD: 357 SECS (ref 79–138)
ANION GAP SERPL CALC-SCNC: 7 MMOL/L (ref 5–15)
APTT PPP: 32.6 SEC (ref 22.1–31)
ATRIAL RATE: 70 BPM
ATRIAL RATE: 70 BPM
ATRIAL RATE: 81 BPM
ATRIAL RATE: 89 BPM
BASOPHILS # BLD: 0.1 K/UL (ref 0–0.1)
BASOPHILS NFR BLD: 1 % (ref 0–1)
BUN SERPL-MCNC: 15 MG/DL (ref 6–20)
BUN/CREAT SERPL: 16 (ref 12–20)
CALCIUM SERPL-MCNC: 8.7 MG/DL (ref 8.5–10.1)
CALCULATED P AXIS, ECG09: 39 DEGREES
CALCULATED P AXIS, ECG09: 40 DEGREES
CALCULATED P AXIS, ECG09: 40 DEGREES
CALCULATED P AXIS, ECG09: 53 DEGREES
CALCULATED R AXIS, ECG10: -16 DEGREES
CALCULATED R AXIS, ECG10: -28 DEGREES
CALCULATED R AXIS, ECG10: -36 DEGREES
CALCULATED R AXIS, ECG10: -7 DEGREES
CALCULATED T AXIS, ECG11: 47 DEGREES
CALCULATED T AXIS, ECG11: 48 DEGREES
CALCULATED T AXIS, ECG11: 75 DEGREES
CALCULATED T AXIS, ECG11: 94 DEGREES
CHLORIDE SERPL-SCNC: 106 MMOL/L (ref 97–108)
CO2 SERPL-SCNC: 24 MMOL/L (ref 21–32)
CREAT SERPL-MCNC: 0.95 MG/DL (ref 0.7–1.3)
DIAGNOSIS, 93000: NORMAL
DIFFERENTIAL METHOD BLD: ABNORMAL
EOSINOPHIL # BLD: 0.3 K/UL (ref 0–0.4)
EOSINOPHIL NFR BLD: 3 % (ref 0–7)
ERYTHROCYTE [DISTWIDTH] IN BLOOD BY AUTOMATED COUNT: 14 % (ref 11.5–14.5)
GLUCOSE SERPL-MCNC: 146 MG/DL (ref 65–100)
HCT VFR BLD AUTO: 43.8 % (ref 36.6–50.3)
HGB BLD-MCNC: 14.7 G/DL (ref 12.1–17)
IMM GRANULOCYTES # BLD AUTO: 0.1 K/UL (ref 0–0.04)
IMM GRANULOCYTES NFR BLD AUTO: 1 % (ref 0–0.5)
LYMPHOCYTES # BLD: 1.8 K/UL (ref 0.8–3.5)
LYMPHOCYTES NFR BLD: 18 % (ref 12–49)
MAGNESIUM SERPL-MCNC: 2 MG/DL (ref 1.6–2.4)
MCH RBC QN AUTO: 28.8 PG (ref 26–34)
MCHC RBC AUTO-ENTMCNC: 33.6 G/DL (ref 30–36.5)
MCV RBC AUTO: 85.7 FL (ref 80–99)
MONOCYTES # BLD: 0.9 K/UL (ref 0–1)
MONOCYTES NFR BLD: 8 % (ref 5–13)
NEUTS SEG # BLD: 7.3 K/UL (ref 1.8–8)
NEUTS SEG NFR BLD: 69 % (ref 32–75)
NRBC # BLD: 0 K/UL (ref 0–0.01)
NRBC BLD-RTO: 0 PER 100 WBC
P-R INTERVAL, ECG05: 166 MS
P-R INTERVAL, ECG05: 170 MS
P-R INTERVAL, ECG05: 186 MS
P-R INTERVAL, ECG05: 202 MS
PLATELET # BLD AUTO: 212 K/UL (ref 150–400)
PMV BLD AUTO: 10 FL (ref 8.9–12.9)
POTASSIUM SERPL-SCNC: 3.8 MMOL/L (ref 3.5–5.1)
Q-T INTERVAL, ECG07: 370 MS
Q-T INTERVAL, ECG07: 378 MS
Q-T INTERVAL, ECG07: 380 MS
Q-T INTERVAL, ECG07: 400 MS
QRS DURATION, ECG06: 118 MS
QRS DURATION, ECG06: 86 MS
QRS DURATION, ECG06: 88 MS
QRS DURATION, ECG06: 96 MS
QTC CALCULATION (BEZET), ECG08: 408 MS
QTC CALCULATION (BEZET), ECG08: 429 MS
QTC CALCULATION (BEZET), ECG08: 432 MS
QTC CALCULATION (BEZET), ECG08: 462 MS
RBC # BLD AUTO: 5.11 M/UL (ref 4.1–5.7)
SODIUM SERPL-SCNC: 137 MMOL/L (ref 136–145)
THERAPEUTIC RANGE,PTTT: ABNORMAL SECS (ref 58–77)
TROPONIN I SERPL-MCNC: 48 NG/ML
VENTRICULAR RATE, ECG03: 70 BPM
VENTRICULAR RATE, ECG03: 70 BPM
VENTRICULAR RATE, ECG03: 81 BPM
VENTRICULAR RATE, ECG03: 89 BPM
WBC # BLD AUTO: 10.4 K/UL (ref 4.1–11.1)

## 2021-08-20 PROCEDURE — 93005 ELECTROCARDIOGRAM TRACING: CPT

## 2021-08-20 PROCEDURE — 74011250636 HC RX REV CODE- 250/636: Performed by: INTERNAL MEDICINE

## 2021-08-20 PROCEDURE — B240ZZ3 ULTRASONOGRAPHY OF SINGLE CORONARY ARTERY, INTRAVASCULAR: ICD-10-PCS | Performed by: INTERNAL MEDICINE

## 2021-08-20 PROCEDURE — C1753 CATH, INTRAVAS ULTRASOUND: HCPCS | Performed by: INTERNAL MEDICINE

## 2021-08-20 PROCEDURE — C1874 STENT, COATED/COV W/DEL SYS: HCPCS | Performed by: INTERNAL MEDICINE

## 2021-08-20 PROCEDURE — 77030019569 HC BND COMPR RAD TERU -B: Performed by: INTERNAL MEDICINE

## 2021-08-20 PROCEDURE — 65660000000 HC RM CCU STEPDOWN

## 2021-08-20 PROCEDURE — 77010033678 HC OXYGEN DAILY

## 2021-08-20 PROCEDURE — 92978 ENDOLUMINL IVUS OCT C 1ST: CPT | Performed by: INTERNAL MEDICINE

## 2021-08-20 PROCEDURE — 85347 COAGULATION TIME ACTIVATED: CPT

## 2021-08-20 PROCEDURE — 02C03ZZ EXTIRPATION OF MATTER FROM CORONARY ARTERY, ONE ARTERY, PERCUTANEOUS APPROACH: ICD-10-PCS | Performed by: INTERNAL MEDICINE

## 2021-08-20 PROCEDURE — 4A023N7 MEASUREMENT OF CARDIAC SAMPLING AND PRESSURE, LEFT HEART, PERCUTANEOUS APPROACH: ICD-10-PCS | Performed by: INTERNAL MEDICINE

## 2021-08-20 PROCEDURE — 77030010221 HC SPLNT WR POS TELE -B: Performed by: INTERNAL MEDICINE

## 2021-08-20 PROCEDURE — B2111ZZ FLUOROSCOPY OF MULTIPLE CORONARY ARTERIES USING LOW OSMOLAR CONTRAST: ICD-10-PCS | Performed by: INTERNAL MEDICINE

## 2021-08-20 PROCEDURE — C1769 GUIDE WIRE: HCPCS | Performed by: INTERNAL MEDICINE

## 2021-08-20 PROCEDURE — 74011000250 HC RX REV CODE- 250: Performed by: INTERNAL MEDICINE

## 2021-08-20 PROCEDURE — 74011000636 HC RX REV CODE- 636: Performed by: INTERNAL MEDICINE

## 2021-08-20 PROCEDURE — 84484 ASSAY OF TROPONIN QUANT: CPT

## 2021-08-20 PROCEDURE — C1725 CATH, TRANSLUMIN NON-LASER: HCPCS | Performed by: INTERNAL MEDICINE

## 2021-08-20 PROCEDURE — 74011250637 HC RX REV CODE- 250/637: Performed by: INTERNAL MEDICINE

## 2021-08-20 PROCEDURE — 85730 THROMBOPLASTIN TIME PARTIAL: CPT

## 2021-08-20 PROCEDURE — 36415 COLL VENOUS BLD VENIPUNCTURE: CPT

## 2021-08-20 PROCEDURE — 77030012468 HC VLV BLEEDBK CNTRL ABBT -B: Performed by: INTERNAL MEDICINE

## 2021-08-20 PROCEDURE — 77030037392 HC CANN PUMP/FLTR INDIGO PENU -E: Performed by: INTERNAL MEDICINE

## 2021-08-20 PROCEDURE — 80048 BASIC METABOLIC PNL TOTAL CA: CPT

## 2021-08-20 PROCEDURE — 93458 L HRT ARTERY/VENTRICLE ANGIO: CPT | Performed by: INTERNAL MEDICINE

## 2021-08-20 PROCEDURE — 027034Z DILATION OF CORONARY ARTERY, ONE ARTERY WITH DRUG-ELUTING INTRALUMINAL DEVICE, PERCUTANEOUS APPROACH: ICD-10-PCS | Performed by: INTERNAL MEDICINE

## 2021-08-20 PROCEDURE — 93306 TTE W/DOPPLER COMPLETE: CPT

## 2021-08-20 PROCEDURE — 2709999900 HC NON-CHARGEABLE SUPPLY

## 2021-08-20 PROCEDURE — 74011250637 HC RX REV CODE- 250/637: Performed by: STUDENT IN AN ORGANIZED HEALTH CARE EDUCATION/TRAINING PROGRAM

## 2021-08-20 PROCEDURE — 83735 ASSAY OF MAGNESIUM: CPT

## 2021-08-20 PROCEDURE — 77030008543 HC TBNG MON PRSS MRTM -A: Performed by: INTERNAL MEDICINE

## 2021-08-20 PROCEDURE — 77030004543 HC CATH ANGI DX MRTM -A: Performed by: INTERNAL MEDICINE

## 2021-08-20 PROCEDURE — C1894 INTRO/SHEATH, NON-LASER: HCPCS | Performed by: INTERNAL MEDICINE

## 2021-08-20 PROCEDURE — 77030030195 HC CATH ANGI DX PRF4 MRTM -A: Performed by: INTERNAL MEDICINE

## 2021-08-20 PROCEDURE — 85025 COMPLETE CBC W/AUTO DIFF WBC: CPT

## 2021-08-20 PROCEDURE — C1887 CATHETER, GUIDING: HCPCS | Performed by: INTERNAL MEDICINE

## 2021-08-20 PROCEDURE — 77030015766: Performed by: INTERNAL MEDICINE

## 2021-08-20 PROCEDURE — 92928 PRQ TCAT PLMT NTRAC ST 1 LES: CPT | Performed by: INTERNAL MEDICINE

## 2021-08-20 PROCEDURE — C1757 CATH, THROMBECTOMY/EMBOLECT: HCPCS | Performed by: INTERNAL MEDICINE

## 2021-08-20 PROCEDURE — 74011000258 HC RX REV CODE- 258: Performed by: INTERNAL MEDICINE

## 2021-08-20 PROCEDURE — 99152 MOD SED SAME PHYS/QHP 5/>YRS: CPT | Performed by: INTERNAL MEDICINE

## 2021-08-20 PROCEDURE — 2709999900 HC NON-CHARGEABLE SUPPLY: Performed by: INTERNAL MEDICINE

## 2021-08-20 PROCEDURE — 77030019698 HC SYR ANGI MDLON MRTM -A: Performed by: INTERNAL MEDICINE

## 2021-08-20 PROCEDURE — 74011250637 HC RX REV CODE- 250/637: Performed by: NURSE PRACTITIONER

## 2021-08-20 PROCEDURE — 99153 MOD SED SAME PHYS/QHP EA: CPT | Performed by: INTERNAL MEDICINE

## 2021-08-20 DEVICE — XIENCE SIERRA™ EVEROLIMUS ELUTING CORONARY STENT SYSTEM 4.00 MM X 18 MM / RAPID-EXCHANGE
Type: IMPLANTABLE DEVICE | Status: FUNCTIONAL
Brand: XIENCE SIERRA™

## 2021-08-20 RX ORDER — MORPHINE SULFATE 2 MG/ML
2 INJECTION, SOLUTION INTRAMUSCULAR; INTRAVENOUS ONCE
Status: COMPLETED | OUTPATIENT
Start: 2021-08-20 | End: 2021-08-20

## 2021-08-20 RX ORDER — ROSUVASTATIN CALCIUM 20 MG/1
20 TABLET, COATED ORAL
Status: DISCONTINUED | OUTPATIENT
Start: 2021-08-20 | End: 2021-08-22 | Stop reason: HOSPADM

## 2021-08-20 RX ORDER — VERAPAMIL HYDROCHLORIDE 2.5 MG/ML
INJECTION, SOLUTION INTRAVENOUS AS NEEDED
Status: DISCONTINUED | OUTPATIENT
Start: 2021-08-20 | End: 2021-08-20 | Stop reason: HOSPADM

## 2021-08-20 RX ORDER — SODIUM CHLORIDE 9 MG/ML
75 INJECTION, SOLUTION INTRAVENOUS CONTINUOUS
Status: DISCONTINUED | OUTPATIENT
Start: 2021-08-20 | End: 2021-08-20

## 2021-08-20 RX ORDER — OMEPRAZOLE 20 MG/1
20 CAPSULE, DELAYED RELEASE ORAL DAILY
COMMUNITY

## 2021-08-20 RX ORDER — HEPARIN SODIUM 1000 [USP'U]/ML
INJECTION, SOLUTION INTRAVENOUS; SUBCUTANEOUS AS NEEDED
Status: DISCONTINUED | OUTPATIENT
Start: 2021-08-20 | End: 2021-08-20 | Stop reason: HOSPADM

## 2021-08-20 RX ORDER — ASCORBIC ACID 500 MG
1000 TABLET ORAL DAILY
COMMUNITY

## 2021-08-20 RX ORDER — LOSARTAN POTASSIUM 25 MG/1
25 TABLET ORAL DAILY
Status: DISCONTINUED | OUTPATIENT
Start: 2021-08-20 | End: 2021-08-22 | Stop reason: HOSPADM

## 2021-08-20 RX ORDER — LIDOCAINE HYDROCHLORIDE 10 MG/ML
INJECTION, SOLUTION EPIDURAL; INFILTRATION; INTRACAUDAL; PERINEURAL AS NEEDED
Status: DISCONTINUED | OUTPATIENT
Start: 2021-08-20 | End: 2021-08-20 | Stop reason: HOSPADM

## 2021-08-20 RX ORDER — HEPARIN SODIUM 200 [USP'U]/100ML
INJECTION, SOLUTION INTRAVENOUS
Status: COMPLETED | OUTPATIENT
Start: 2021-08-20 | End: 2021-08-20

## 2021-08-20 RX ORDER — SODIUM CHLORIDE 0.9 % (FLUSH) 0.9 %
5-40 SYRINGE (ML) INJECTION AS NEEDED
Status: DISCONTINUED | OUTPATIENT
Start: 2021-08-20 | End: 2021-08-22 | Stop reason: HOSPADM

## 2021-08-20 RX ORDER — NALOXONE HYDROCHLORIDE 0.4 MG/ML
0.4 INJECTION, SOLUTION INTRAMUSCULAR; INTRAVENOUS; SUBCUTANEOUS AS NEEDED
Status: DISCONTINUED | OUTPATIENT
Start: 2021-08-20 | End: 2021-08-22 | Stop reason: HOSPADM

## 2021-08-20 RX ORDER — MIDAZOLAM HYDROCHLORIDE 1 MG/ML
INJECTION, SOLUTION INTRAMUSCULAR; INTRAVENOUS AS NEEDED
Status: DISCONTINUED | OUTPATIENT
Start: 2021-08-20 | End: 2021-08-20 | Stop reason: HOSPADM

## 2021-08-20 RX ORDER — SODIUM CHLORIDE 0.9 % (FLUSH) 0.9 %
5-40 SYRINGE (ML) INJECTION EVERY 8 HOURS
Status: DISCONTINUED | OUTPATIENT
Start: 2021-08-20 | End: 2021-08-22 | Stop reason: HOSPADM

## 2021-08-20 RX ORDER — DIPHENHYDRAMINE HYDROCHLORIDE 50 MG/ML
25 INJECTION, SOLUTION INTRAMUSCULAR; INTRAVENOUS
Status: DISCONTINUED | OUTPATIENT
Start: 2021-08-20 | End: 2021-08-22 | Stop reason: HOSPADM

## 2021-08-20 RX ORDER — FENTANYL CITRATE 50 UG/ML
INJECTION, SOLUTION INTRAMUSCULAR; INTRAVENOUS AS NEEDED
Status: DISCONTINUED | OUTPATIENT
Start: 2021-08-20 | End: 2021-08-20 | Stop reason: HOSPADM

## 2021-08-20 RX ADMIN — Medication 10 ML: at 03:32

## 2021-08-20 RX ADMIN — TICAGRELOR 90 MG: 90 TABLET ORAL at 18:00

## 2021-08-20 RX ADMIN — Medication 10 ML: at 06:32

## 2021-08-20 RX ADMIN — LOSARTAN POTASSIUM 25 MG: 25 TABLET, FILM COATED ORAL at 17:30

## 2021-08-20 RX ADMIN — ASPIRIN 81 MG: 81 TABLET, CHEWABLE ORAL at 08:27

## 2021-08-20 RX ADMIN — PANTOPRAZOLE SODIUM 40 MG: 40 TABLET, DELAYED RELEASE ORAL at 08:28

## 2021-08-20 RX ADMIN — ROSUVASTATIN 20 MG: 20 TABLET, FILM COATED ORAL at 21:03

## 2021-08-20 RX ADMIN — METOPROLOL TARTRATE 25 MG: 25 TABLET, FILM COATED ORAL at 17:08

## 2021-08-20 RX ADMIN — SODIUM CHLORIDE 75 ML/HR: 900 INJECTION, SOLUTION INTRAVENOUS at 06:22

## 2021-08-20 RX ADMIN — ACETAMINOPHEN 650 MG: 325 TABLET ORAL at 06:39

## 2021-08-20 RX ADMIN — MORPHINE SULFATE 2 MG: 2 INJECTION, SOLUTION INTRAMUSCULAR; INTRAVENOUS at 03:20

## 2021-08-20 RX ADMIN — Medication 10 ML: at 17:10

## 2021-08-20 RX ADMIN — METOPROLOL TARTRATE 25 MG: 25 TABLET, FILM COATED ORAL at 08:28

## 2021-08-20 RX ADMIN — MORPHINE SULFATE 2 MG: 2 INJECTION, SOLUTION INTRAMUSCULAR; INTRAVENOUS at 01:26

## 2021-08-20 RX ADMIN — ACETAMINOPHEN: 500 TABLET ORAL at 00:37

## 2021-08-20 RX ADMIN — Medication 10 ML: at 21:03

## 2021-08-20 NOTE — PROGRESS NOTES
TRANSFER - IN REPORT:    Verbal report received from Cath Lab Staff Dina Barbosa RN) (name) on Joselito Beltran  being received from [facility] (unit) for routine progression of care      Report consisted of patients Situation, Background, Assessment and   Recommendations(SBAR). Information from the following report(s) Procedure Summary was reviewed with the receiving nurse. Opportunity for questions and clarification was provided. Assessment completed upon patients arrival to unit and care assumed.

## 2021-08-20 NOTE — PROGRESS NOTES
Cath lab staffs is taking pt. To cath lab. Pt. Reported Left chest pressure 6/10. Heparin infusing at 10 units/kg/hr. Nitro gtt is infusing at 100 mcg/min.

## 2021-08-20 NOTE — PROGRESS NOTES
Problem: Falls - Risk of  Goal: *Absence of Falls  Description: Document Kayla Michelle Fall Risk and appropriate interventions in the flowsheet. Outcome: Progressing Towards Goal  Note: Fall Risk Interventions:            Medication Interventions: Assess postural VS orthostatic hypotension, Bed/chair exit alarm, Evaluate medications/consider consulting pharmacy, Patient to call before getting OOB, Teach patient to arise slowly                   Problem: Patient Education: Go to Patient Education Activity  Goal: Patient/Family Education  Outcome: Progressing Towards Goal     Problem: Pain  Goal: *Control of Pain  Outcome: Progressing Towards Goal  Goal: *PALLIATIVE CARE:  Alleviation of Pain  Outcome: Progressing Towards Goal     Problem: Patient Education: Go to Patient Education Activity  Goal: Patient/Family Education  Outcome: Progressing Towards Goal     Problem: Pressure Injury - Risk of  Goal: *Prevention of pressure injury  Description: Document Sourav Scale and appropriate interventions in the flowsheet. Outcome: Progressing Towards Goal  Note: Pressure Injury Interventions:  Sensory Interventions: Assess changes in LOC, Avoid rigorous massage over bony prominences, Keep linens dry and wrinkle-free, Maintain/enhance activity level, Minimize linen layers, Turn and reposition approx.  every two hours (pillows and wedges if needed)         Activity Interventions: Assess need for specialty bed, Increase time out of bed, Pressure redistribution bed/mattress(bed type), PT/OT evaluation                               Problem: Patient Education: Go to Patient Education Activity  Goal: Patient/Family Education  Outcome: Progressing Towards Goal     Problem: Patient Education: Go to Patient Education Activity  Goal: Patient/Family Education  Outcome: Progressing Towards Goal     Problem: Unstable angina/NSTEMI: Day of Admission/Day 1  Goal: Off Pathway (Use only if patient is Off Pathway)  Outcome: Progressing Towards Goal  Goal: Activity/Safety  Outcome: Progressing Towards Goal  Goal: Consults, if ordered  Outcome: Progressing Towards Goal  Goal: Diagnostic Test/Procedures  Outcome: Progressing Towards Goal  Goal: Nutrition/Diet  Outcome: Progressing Towards Goal  Goal: Discharge Planning  Outcome: Progressing Towards Goal  Goal: Medications  Outcome: Progressing Towards Goal  Goal: Respiratory  Outcome: Progressing Towards Goal  Goal: Treatments/Interventions/Procedures  Outcome: Progressing Towards Goal  Goal: Psychosocial  Outcome: Progressing Towards Goal  Goal: *Hemodynamically stable  Outcome: Progressing Towards Goal  Goal: *Optimal pain control at patient's stated goal  Outcome: Progressing Towards Goal  Goal: *Lungs clear or at baseline  Outcome: Progressing Towards Goal     Problem: Unstable angina/NSTEMI: Day 2  Goal: Off Pathway (Use only if patient is Off Pathway)  Outcome: Progressing Towards Goal  Goal: Activity/Safety  Outcome: Progressing Towards Goal  Goal: Consults, if ordered  Outcome: Progressing Towards Goal  Goal: Diagnostic Test/Procedures  Outcome: Progressing Towards Goal  Goal: Nutrition/Diet  Outcome: Progressing Towards Goal  Goal: Discharge Planning  Outcome: Progressing Towards Goal  Goal: Medications  Outcome: Progressing Towards Goal  Goal: Respiratory  Outcome: Progressing Towards Goal  Goal: Treatments/Interventions/Procedures  Outcome: Progressing Towards Goal  Goal: Psychosocial  Outcome: Progressing Towards Goal  Goal: *Hemodynamically stable  Outcome: Progressing Towards Goal  Goal: *Optimal pain control at patient's stated goal  Outcome: Progressing Towards Goal  Goal: *Lungs clear or at baseline  Outcome: Progressing Towards Goal     Problem: Patient Education: Go to Patient Education Activity  Goal: Patient/Family Education  Outcome: Progressing Towards Goal     Problem: Cath Lab Procedures: Pre-Procedure  Goal: Off Pathway (Use only if patient is Off Pathway)  Outcome: Progressing Towards Goal  Goal: Activity/Safety  Outcome: Progressing Towards Goal  Goal: Consults, if ordered  Outcome: Progressing Towards Goal  Goal: Diagnostic Test/Procedures  Outcome: Progressing Towards Goal  Goal: Nutrition/Diet  Outcome: Progressing Towards Goal  Goal: Discharge Planning  Outcome: Progressing Towards Goal  Goal: Medications  Outcome: Progressing Towards Goal  Goal: Respiratory  Outcome: Progressing Towards Goal  Goal: Treatments/Interventions/Procedures  Outcome: Progressing Towards Goal  Goal: Psychosocial  Outcome: Progressing Towards Goal  Goal: *Verbalize description of procedure  Outcome: Progressing Towards Goal  Goal: *Consent signed  Outcome: Progressing Towards Goal     Problem: Cath Lab Procedures: Post-Cath Day of Procedure (Initiate SCIP Measures for Post-Op Care)  Goal: Off Pathway (Use only if patient is Off Pathway)  Outcome: Progressing Towards Goal  Goal: Activity/Safety  Outcome: Progressing Towards Goal  Goal: Consults, if ordered  Outcome: Progressing Towards Goal  Goal: Diagnostic Test/Procedures  Outcome: Progressing Towards Goal  Goal: Nutrition/Diet  Outcome: Progressing Towards Goal  Goal: Discharge Planning  Outcome: Progressing Towards Goal  Goal: Medications  Outcome: Progressing Towards Goal  Goal: Respiratory  Outcome: Progressing Towards Goal  Goal: Treatments/Interventions/Procedures  Outcome: Progressing Towards Goal  Goal: Psychosocial  Outcome: Progressing Towards Goal  Goal: *Procedure site is without bleeding and signs of infection six hours post sheath removal  Outcome: Progressing Towards Goal  Goal: *Hemodynamically stable  Outcome: Progressing Towards Goal  Goal: *Optimal pain control at patient's stated goal  Outcome: Progressing Towards Goal

## 2021-08-20 NOTE — PROGRESS NOTES
Cardiology Progress Note      8/20/2021 10:34 AM    Admit Date: 8/19/2021          Subjective: Having some hematuria. Chest pain better. No other c/o          Visit Vitals  /81   Pulse 78   Temp 97.6 °F (36.4 °C)   Resp 20   Ht 5' 9\" (1.753 m)   Wt 100.3 kg (221 lb 1.6 oz)   SpO2 92%   BMI 32.65 kg/m²     08/18 1901 - 08/20 0700  In: 200 [P.O.:200]  Out: 150 [Urine:150]        Objective:      Physical Exam:  VS as above  Chest CTA  Card RRR No MRG  Neuro awake and alert     Data Review:   Labs:    Trop 48  Creat 0.95     Telemetry: SR R 67       Assessment:     1. NSTEMI, s/p PCI of 90% ulcerated prox LAD   2. History of hypertension. 3.  Gastroesophageal reflux. 4.  History of nephrolithiasis. 5.  History of tobacco abuse with none for 2 years. 6.  elevated LVEDP at cath, unknown EF   7. Hematuria     Plan: Await echo. Add ARB, Check lipid profile.  Monitor hematuria for now- good UO

## 2021-08-20 NOTE — PROGRESS NOTES
Received notification from bedside RN about patient with regards to: requesting medication to help him sleep  VS: /85, HR 84, RR 17, O2 sat 93%     Intervention given: Tylenol PM x 1 dose ordered

## 2021-08-20 NOTE — PROGRESS NOTES
CHG bath and sites prep are complete. Pt. Would like to discuss with MD about cardiac cath procedure with risks and benefits prior to consent to the procedure.

## 2021-08-20 NOTE — PROGRESS NOTES
Notified Dr. Chelle Zacarias of troponin result:  25.2 @ [phone] (increase from 1.66 @ 1905) and sustained CP 4/10 on Nitro gtt at 50 mcg/min. Received order for 2 mg IV morphine and increase nitro gtt max rate to 100 mcg/min titratable.

## 2021-08-20 NOTE — PROGRESS NOTES
TRANSFER - IN REPORT:    Verbal report received from Temo RN (name) on Constantine Portillo  being received from ED # 10 (unit) for routine progression of care      Report consisted of patients Situation, Background, Assessment and   Recommendations(SBAR). Information from the following report(s) SBAR, Kardex, ED Summary, Intake/Output, MAR, Recent Results, Med Rec Status, Cardiac Rhythm NSR, Alarm Parameters , Pre Procedure Checklist, Procedure Verification, Quality Measures and Dual Neuro Assessment was reviewed with the receiving nurse. Opportunity for questions and clarification was provided. Assessment completed upon patients arrival to unit and care assumed.

## 2021-08-20 NOTE — ED PROVIDER NOTES
EMERGENCY DEPARTMENT HISTORY AND PHYSICAL EXAM      Date: 8/19/2021  Patient Name: Joanna Luna    History of Presenting Illness     Chief Complaint   Patient presents with    Chest Pain       History Provided By: Patient    HPI: Joanna Luna, 57 y.o. male with PMHx significant for hypertension who presents via EMS for acute onset chest pain and prehospital STEMI. Per EMS, patient was eating dinner when he developed acute onset of left-sided chest pain radiating to his shoulder. EKG was concerning for STEMI. Patient with no cardiologist.  Patient tells me that his pain has improved on presentation. Denies any associated shortness of breath, nausea, vomiting, or diaphoresis. PCP: Flor Hinojosa MD    There are no other complaints, changes, or physical findings at this time. Current Facility-Administered Medications   Medication Dose Route Frequency Provider Last Rate Last Admin    heparin 25,000 units in D5W 250 ml infusion  10-25 Units/kg/hr IntraVENous TITRATE Akash Hernandez MD 10 mL/hr at 08/19/21 2100 10 Units/kg/hr at 08/19/21 2100    [START ON 8/20/2021] heparin (porcine) injection 2,000 Units  2,000 Units IntraVENous PRN Laquita Drew MD        Or   Juan Guidry ON 8/20/2021] heparin (porcine) injection 4,000 Units  4,000 Units IntraVENous PRN MD Mirza Barraza ON 8/20/2021] nitroglycerin (NITROBID) 2 % ointment 1 Inch  1 Inch Topical BID Alfredo Mckeon MD         Current Outpatient Medications   Medication Sig Dispense Refill    butalbital-acetaminophen-caff (FIORICET) -40 mg per capsule May take 1 to 2 caps every 6 hours as needed for pain 20 Cap 0    olmesartan (BENICAR) 20 mg tablet Take 20 mg by mouth daily.  fexofenadine (ALLEGRA) 180 mg tablet Take 60 mg by mouth daily.  ciprofloxacin (CIPRO) 500 mg tablet Take 500 mg by mouth two (2) times a day.       ibuprofen (MOTRIN) 200 mg tablet Take 400 mg by mouth every six (6) hours as needed.  potassium chloride (KLOR-CON M20) 20 mEq tablet Take 20 mEq by mouth two (2) times a day.  omeprazole (PRILOSEC) 40 mg capsule Take 40 mg by mouth daily. Past History     Past Medical History:  Past Medical History:   Diagnosis Date    Hypertension      Past Surgical History:  Past Surgical History:   Procedure Laterality Date    [place] ABDOMEN SURGERY PROC UNLISTED      hernia     Family History:  Family History   Problem Relation Age of Onset    Heart Attack Mother      Social History:  Social History     Tobacco Use    Smoking status: Former Smoker     Packs/day: 1.00     Quit date: 2019     Years since quittin.0   Substance Use Topics    Alcohol use: No    Drug use: Yes     Types: Prescription     Allergies:  No Known Allergies  Review of Systems   Review of Systems   Constitutional: Negative for chills and fever. HENT: Negative for congestion, rhinorrhea and sore throat. Respiratory: Negative for cough and shortness of breath. Cardiovascular: Positive for chest pain. Gastrointestinal: Negative for abdominal pain, nausea and vomiting. Genitourinary: Negative for dysuria and urgency. Skin: Negative for rash. Neurological: Negative for dizziness, light-headedness and headaches. All other systems reviewed and are negative. Physical Exam   Physical Exam  Vitals and nursing note reviewed. Constitutional:       General: He is not in acute distress. Appearance: He is well-developed. HENT:      Head: Normocephalic and atraumatic. Eyes:      Conjunctiva/sclera: Conjunctivae normal.      Pupils: Pupils are equal, round, and reactive to light. Cardiovascular:      Rate and Rhythm: Normal rate and regular rhythm. Pulmonary:      Effort: Pulmonary effort is normal. No respiratory distress. Breath sounds: Normal breath sounds. No stridor. Abdominal:      General: There is no distension. Palpations: Abdomen is soft. Tenderness:  There is no abdominal tenderness. Musculoskeletal:         General: Normal range of motion. Cervical back: Normal range of motion. Skin:     General: Skin is warm and dry. Neurological:      Mental Status: He is alert and oriented to person, place, and time. Diagnostic Study Results   Labs -     Recent Results (from the past 12 hour(s))   CBC WITH AUTOMATED DIFF    Collection Time: 08/19/21  7:05 PM   Result Value Ref Range    WBC 7.9 4.1 - 11.1 K/uL    RBC 5.55 4.10 - 5.70 M/uL    HGB 15.6 12.1 - 17.0 g/dL    HCT 47.8 36.6 - 50.3 %    MCV 86.1 80.0 - 99.0 FL    MCH 28.1 26.0 - 34.0 PG    MCHC 32.6 30.0 - 36.5 g/dL    RDW 13.8 11.5 - 14.5 %    PLATELET 028 998 - 079 K/uL    MPV 10.3 8.9 - 12.9 FL    NRBC 0.0 0  WBC    ABSOLUTE NRBC 0.00 0.00 - 0.01 K/uL    NEUTROPHILS 68 32 - 75 %    LYMPHOCYTES 18 12 - 49 %    MONOCYTES 7 5 - 13 %    EOSINOPHILS 5 0 - 7 %    BASOPHILS 1 0 - 1 %    IMMATURE GRANULOCYTES 1 (H) 0.0 - 0.5 %    ABS. NEUTROPHILS 5.4 1.8 - 8.0 K/UL    ABS. LYMPHOCYTES 1.4 0.8 - 3.5 K/UL    ABS. MONOCYTES 0.6 0.0 - 1.0 K/UL    ABS. EOSINOPHILS 0.4 0.0 - 0.4 K/UL    ABS. BASOPHILS 0.1 0.0 - 0.1 K/UL    ABS. IMM. GRANS. 0.1 (H) 0.00 - 0.04 K/UL    DF AUTOMATED     METABOLIC PANEL, COMPREHENSIVE    Collection Time: 08/19/21  7:05 PM   Result Value Ref Range    Sodium 138 136 - 145 mmol/L    Potassium 3.6 3.5 - 5.1 mmol/L    Chloride 106 97 - 108 mmol/L    CO2 25 21 - 32 mmol/L    Anion gap 7 5 - 15 mmol/L    Glucose 147 (H) 65 - 100 mg/dL    BUN 12 6 - 20 MG/DL    Creatinine 1.05 0.70 - 1.30 MG/DL    BUN/Creatinine ratio 11 (L) 12 - 20      GFR est AA >60 >60 ml/min/1.73m2    GFR est non-AA >60 >60 ml/min/1.73m2    Calcium 9.1 8.5 - 10.1 MG/DL    Bilirubin, total 0.7 0.2 - 1.0 MG/DL    ALT (SGPT) 87 (H) 12 - 78 U/L    AST (SGOT) 65 (H) 15 - 37 U/L    Alk.  phosphatase 74 45 - 117 U/L    Protein, total 7.5 6.4 - 8.2 g/dL    Albumin 3.8 3.5 - 5.0 g/dL    Globulin 3.7 2.0 - 4.0 g/dL    A-G Ratio 1.0 (L) 1.1 - 2.2     CK W/ REFLX CKMB    Collection Time: 08/19/21  7:05 PM   Result Value Ref Range     (H) 39 - 308 U/L   TROPONIN I    Collection Time: 08/19/21  7:05 PM   Result Value Ref Range    Troponin-I, Qt. 1.66 (H) <0.05 ng/mL   SAMPLES BEING HELD    Collection Time: 08/19/21  7:05 PM   Result Value Ref Range    SAMPLES BEING HELD [facility],RD     COMMENT        Add-on orders for these samples will be processed based on acceptable specimen integrity and analyte stability, which may vary by analyte. PROTHROMBIN TIME + INR    Collection Time: 08/19/21  7:05 PM   Result Value Ref Range    INR 1.0 0.9 - 1.1      Prothrombin time 10.6 9.0 - 11.1 sec   PTT    Collection Time: 08/19/21  7:05 PM   Result Value Ref Range    aPTT 25.9 22.1 - 31.0 sec    aPTT, therapeutic range     58.0 - 77.0 SECS   CK-MB,QUANT.     Collection Time: 08/19/21  7:05 PM   Result Value Ref Range    CK - MB 19.8 (H) <3.6 NG/ML    CK-MB Index 4.1 (H) 0.0 - 2.5     EKG, 12 LEAD, INITIAL    Collection Time: 08/19/21  7:05 PM   Result Value Ref Range    Ventricular Rate 89 BPM    Atrial Rate 89 BPM    P-R Interval 166 ms    QRS Duration 118 ms    Q-T Interval 380 ms    QTC Calculation (Bezet) 462 ms    Calculated P Axis 39 degrees    Calculated R Axis -36 degrees    Calculated T Axis 94 degrees    Diagnosis       Sinus rhythm with premature atrial complexes  Left axis deviation  Left ventricular hypertrophy with QRS widening and repolarization abnormality  Cannot rule out Septal infarct , age undetermined  No previous ECGs available     POC TROPONIN-I    Collection Time: 08/19/21  7:09 PM   Result Value Ref Range    Troponin-I (POC) 0.45 (H) 0.00 - 0.08 ng/mL   EKG, 12 LEAD, SUBSEQUENT    Collection Time: 08/19/21  8:34 PM   Result Value Ref Range    Ventricular Rate 81 BPM    Atrial Rate 81 BPM    P-R Interval 170 ms    QRS Duration 96 ms    Q-T Interval 370 ms    QTC Calculation (Bezet) 429 ms    Calculated P Axis 40 degrees    Calculated R Axis -28 degrees    Calculated T Axis 75 degrees    Diagnosis       Normal sinus rhythm with sinus arrhythmia  Normal ECG  When compared with ECG of 19-AUG-2021 19:05,  MANUAL COMPARISON REQUIRED, DATA IS UNCONFIRMED         Radiologic Studies -   XR CHEST PORT   Final Result   No acute findings. XR CHEST PORT    Result Date: 8/19/2021  No acute findings. Medical Decision Making   I am the first provider for this patient. I reviewed the vital signs, available nursing notes, past medical history, past surgical history, family history and social history. Vital Signs-Reviewed the patient's vital signs. Patient Vitals for the past 12 hrs:   Temp Pulse Resp BP SpO2   08/19/21 1914 98.1 °F (36.7 °C) -- -- -- --   08/19/21 1911 -- 88 14 135/88 96 %   08/19/21 1908 -- 84 14 -- 96 %       Pulse Oximetry Analysis - 96% on ra    Cardiac Monitor:   Rate: 84 bpm  Rhythm: Normal Sinus Rhythm      ED EKG interpretation:  Rhythm: normal sinus rhythm and PAC's; and regular . Rate (approx.): 89; Axis: left axis deviation; P wave: normal; QRS interval: normal ; ST/T wave: normal; Other findings: borderline ekg. This EKG was interpreted by YESI Garcia MD,ED Provider. Records Reviewed: Nursing Notes and Old Medical Records    Provider Notes (Medical Decision Making):   Patient presents with a chief complaint of chest pain. Was a prehospital STEMI alert, however on presentation patient's EKG is not consistent with a STEMI. This was reviewed with Dr. Edel Morley at the bedside. We will proceed with work-up for cardiac chest pain including basic lab work, troponin, chest x-ray. ED Course:   Initial assessment performed. The patients presenting problems have been discussed, and they are in agreement with the care plan formulated and outlined with them. I have encouraged them to ask questions as they arise throughout their visit. Troponin returned elevated. Patient started on heparin drip.   Discussed with hospitalist, Dr. Jorge Dhillon, for admission    Procedures:  Procedures    Critical Care:  CRITICAL CARE NOTE :  IMPENDING DETERIORATION -Cardiovascular  ASSOCIATED RISK FACTORS - Hypotension, Shock and Dysrhythmia  MANAGEMENT- Bedside Assessment  INTERPRETATION -  Xrays, ECG and Blood Pressure  INTERVENTIONS - hemodynamic mngmt  CASE REVIEW - Hospitalist/Intensivist  TREATMENT RESPONSE -Stable  PERFORMED BY - Self    NOTES   :    I have spent 35 minutes of critical care time involved in lab review, consultations with specialist, family decision- making, bedside attention and documentation. During this entire length of time I was immediately available to the patient . Brandi Shepherd MD      Disposition:    Admission Note:  Patient is being admitted to the hospital by Dr. Jorge Dhillon, Service: Hospitalist.  The results of their tests and reasons for their admission have been discussed with them and available family. They convey agreement and understanding for the need to be admitted and for their admission diagnosis. Diagnosis     Clinical Impression:   1. NSTEMI (non-ST elevated myocardial infarction) [facility])            Please note that this dictation was completed with [employer], the computer voice recognition software. Quite often unanticipated grammatical, syntax, homophones, and other interpretive errors are inadvertently transcribed by the computer software. Please disregard these errors.   Please excuse any errors that have escaped final proofreading

## 2021-08-20 NOTE — PROGRESS NOTES
Pt. Returned to [facility] from [facility]. Angiomax is infusing at 0.5 mg/kg/hr (need to be stopped at 7:30 AM per Cath Lab report (Vijay Fritz)). NS is infusing at 75 ml/hr. Pt. Reported improved chest discomfort 2/10. Bilateral shoulder pain 6/10.

## 2021-08-20 NOTE — PROGRESS NOTES
Bedside shift change report given to Peng Smith RN (oncoming nurse) by Fadumo Monterroso RN (offgoing nurse). Report included the following information SBAR, Kardex, Procedure Summary, Intake/Output, MAR, Accordion, Recent Results, Med Rec Status, Cardiac Rhythm NSR, Alarm Parameters , Pre Procedure Checklist, Procedure Verification, Quality Measures and Dual Neuro Assessment. Stop angiomax infusion. TR band to right radial radial site intact, no bleeding and hematoma. Pt. Report Chest discomfort 3/10, bilateral shoulder pain 2/10. Pt.  Also reported SOB, currently 2 L NC.

## 2021-08-20 NOTE — PROGRESS NOTES
Received call from Cardiologist (Dr. Mariola Pang) to follow up on pt. Condition. Pt. Reported improved chest discomforted decrease to 2/10, bilateral shoulder pain 2/10 (decrease from 4-5/10), urine with blood (cranberry juice color), RN reported to Dr. Mariola Pang accordingly and received order to continue to monitor pt.

## 2021-08-20 NOTE — PROGRESS NOTES
Pt. Is unable to provide medication list with dosage. Pt. Requested his wife to bring medication list for day shift nurse in the morning. Pt. Is still experiencing chest discomfort and prefer to rest.  Pt. Refuse to answer admission questions at this time.

## 2021-08-20 NOTE — PROGRESS NOTES
0250 - Pt. Reported increase chest pain to 7/10 increase (from 4/10) with Nitro gtt infusing at 100 mcg/min. EKG no significant change (Per NP Tima). Lat BP: 95/68. Pt. Denied SOB. RN notified Dr. Prashant Medina of pt. Condition and received order for morphine 2 mg IV, recheck Troponin lab, hold pending Lopressor medication and call Cath staff in for cardiac cath procedure. [id number] - RN notified nursing supervisor Radha Polanco) of Dr. Prashant Medina request for cath lab team to come in.    Taisha Torrez - Received confirmation from nursing supervisor Radha Polanco) that cath lab team members are on their way.

## 2021-08-20 NOTE — PROGRESS NOTES
Attempted to call for report (#[phone]), but no one  call. Called ER charge nurse (Tracie Richmond), RN was informed that ER nurse will call back to give report shortly.

## 2021-08-20 NOTE — CONSULTS
Pt seen and examined. Full consult dictated    No ST elevation on EKG but initial troponin 1.7. Some relief with NTG    Heparin started.  Will start IV NTG, Metoprolol    Plan cath in AM, tonight if he becomes unstable or does not respond to medical rx

## 2021-08-20 NOTE — CARDIO/PULMONARY
Cardiac Rehab Note: chart review/referral    Pt is a 56 yo admitted with NSTEMI, s/p PCI/LYRIC with thrombectomy 8/19/21. Smoking history assessed. Patient is a former smoker. Smoking Cessation Program link has not been added to the AVS.     EF pending echo. MI education folder, heart attack, heart heathy diet, warning signs, heart facts, catheterization brochure, and out patient cardiac rehab program to [facility] on 8/20/21. Educated using teach back method. Reviewed MI diagnosis definition and purpose of intervention. Discussed risk factors for CAD to include the following: family history, elevated BMI, hyperlipidemia, hypertension, diabetes, and stress. Discussed Heart Healthy/Low Sodium (less than 2000 mg) diet. Reviewed the importance of medication compliance, follow up appointments with cardiologist, signs and symptoms of angina, and what to report to physician after discharge. Emphasized the value of cardiac rehab. Discussed Cardiac Rehab Program format, benefits, and encouraged enrollment to assist with risk modification and management. Pt and wife interested, however, he works full-time and the driving distance from home to [facility] may be an issue for him. Pt has some flexibility due to work schedule of Friday/Saturday/Sunday. He currently is not feeling well, fatigued, some shortness of breath, gas and hematuria and is overwhelmed with the change in his health status. He and his wife would like some time before deciding to commit to enrollment. Contact number provided and CR will f/u with pt. [facility] verbalized understanding with questions answered.   Sathya Chris RN

## 2021-08-20 NOTE — PROGRESS NOTES
Hospitalist Progress Note    NAME: Sarah Beth Hurley   :  1960   MRN:  [id number]       Assessment / Plan:  Chest pain  NSTEMI  Elevated troponin  S/p Cath today, LYRIC to LAD  C/w aspirin, brilinta, cozaar, statin     Hypertension  Resume home meds    Hematuria:  Having some blood urine started today  Will monitor, voiding well     GERD  -Continue with the home medications     Code Status: Full code  Surrogate Decision Maker: Wife     DVT Prophylaxis: Heparin  GI Prophylaxis: not indicated     Baseline: Ambulatory at home  Anticipated discharge:      Subjective:     Chief Complaint / Reason for Physician Visit  Follow up for Chest pain  Seen after Cath, complains of mild chest pain  Had some hematuria    Review of Systems:  Symptom Y/N Comments  Symptom Y/N Comments   Fever/Chills n   Chest Pain y    Poor Appetite n   Edema     Cough    Abdominal Pain     Sputum    Joint Pain     SOB/TREJO    Pruritis/Rash     Nausea/vomit    Tolerating PT/OT     Diarrhea    Tolerating Diet     Constipation    Other       Could NOT obtain due to:      Objective:     VITALS:   Last 24hrs VS reviewed since prior progress note.  Most recent are:  Patient Vitals for the past 24 hrs:   Temp Pulse Resp BP SpO2   21 1514 97.6 °F (36.4 °C) 80 -- 114/78 97 %   21 1443 -- -- -- 108/79 --   21 1300 -- 68 -- 113/85 95 %   21 1230 -- 79 -- (!) 141/93 95 %   21 1215 -- 73 -- 101/70 98 %   21 1200 -- 72 -- 107/68 97 %   21 1145 -- 70 -- 111/83 97 %   21 1130 -- 70 -- 113/79 96 %   21 1115 97.7 °F (36.5 °C) 73 -- 113/81 97 %   21 1101 -- 71 -- 104/71 99 %   21 1045 -- 73 -- 108/79 90 %   21 1030 -- 69 13 103/77 91 %   21 1015 -- 65 21 103/81 95 %   21 1000 -- 78 20 114/81 92 %   21 0945 -- 72 14 108/81 94 %   21 0930 -- 83 21 (!) 123/92 95 %   21 0915 -- 65 14 117/89 94 %   21 0900 -- 80 20 118/84 94 %   21 0845 -- 85 14 109/76 93 %   08/20/21 0830 -- 80 17 116/76 95 %   08/20/21 0815 -- 75 11 115/82 96 %   08/20/21 0800 -- 78 15 125/89 96 %   08/20/21 0745 97.6 °F (36.4 °C) 81 11 122/81 97 %   08/20/21 0730 -- 79 15 116/80 97 %   08/20/21 0715 -- 65 15 112/80 97 %   08/20/21 0700 -- 68 10 118/83 96 %   08/20/21 0645 -- 69 16 99/79 96 %   08/20/21 0630 -- 62 13 106/76 95 %   08/20/21 0615 -- 65 14 94/72 94 %   08/20/21 0606 98.1 °F (36.7 °C) 68 17 111/78 92 %   08/20/21 0340 98 °F (36.7 °C) 75 18 100/68 94 %   08/20/21 0335 -- 81 15 93/72 95 %   08/20/21 0330 -- 77 17 99/61 94 %   08/20/21 0325 -- 74 16 101/66 93 %   08/20/21 0320 -- 76 18 98/64 94 %   08/20/21 0315 -- 70 18 98/65 93 %   08/20/21 0310 -- 75 17 90/67 92 %   08/20/21 0305 -- 71 16 (!) 96/59 94 %   08/20/21 0300 -- 66 16 95/64 93 %   08/20/21 0255 -- 76 13 95/65 94 %   08/20/21 0245 -- 70 18 103/60 93 %   08/20/21 0240 -- 88 17 95/68 92 %   08/20/21 0235 -- 70 18 96/62 93 %   08/20/21 0230 -- 76 16 103/67 94 %   08/20/21 0225 -- 72 17 104/72 94 %   08/20/21 0220 -- 74 16 103/64 94 %   08/20/21 0215 -- 75 16 102/63 94 %   08/20/21 0210 -- 72 17 106/62 94 %   08/20/21 0205 -- 75 18 112/71 94 %   08/20/21 0200 -- 66 17 102/64 93 %   08/20/21 0155 -- 66 18 103/68 93 %   08/20/21 0150 -- 67 17 104/65 93 %   08/20/21 0145 -- 78 16 108/73 94 %   08/20/21 0140 -- 72 18 109/67 93 %   08/20/21 0135 -- 73 17 108/70 93 %   08/20/21 0130 -- 76 17 117/72 93 %   08/20/21 0125 -- 71 17 96/79 94 %   08/20/21 0120 -- 78 19 114/73 94 %   08/20/21 0116 -- 81 15 117/77 93 %   08/20/21 0110 -- 80 19 110/76 94 %   08/20/21 0100 -- 78 18 (!) 111/57 95 %   08/20/21 0055 -- 74 14 113/81 95 %   08/20/21 0050 -- 82 18 122/88 (!) 88 %   08/20/21 0045 -- 76 18 115/85 91 %   08/20/21 0040 -- 76 14 111/82 92 %   08/20/21 0035 -- 77 18 113/82 92 %   08/20/21 0030 -- 82 19 115/79 93 %   08/20/21 0025 -- 80 18 117/84 92 %   08/20/21 0020 -- 75 17 116/85 94 %   08/20/21 0015 -- 81 13 113/84 96 %   08/20/21 0010 -- 81 18 115/86 91 %   08/20/21 0005 -- 80 19 114/84 93 %   08/20/21 0000 -- 91 15 119/87 94 %   08/19/21 2355 -- 82 19 119/81 93 %   08/19/21 2350 -- 89 22 118/83 94 %   08/19/21 2345 -- 88 22 120/85 94 %   08/19/21 2344 -- 84 -- 120/85 --   08/19/21 2325 -- 91 21 -- 93 %   08/19/21 2323 -- 90 18 -- --   08/19/21 2310 99 °F (37.2 °C) 92 18 (!) 117/90 92 %   08/19/21 2300 -- 80 17 100/65 93 %   08/19/21 2254 -- 79 18 112/73 93 %   08/19/21 2248 -- 81 18 128/80 93 %   08/19/21 2245 -- 81 17 126/83 93 %   08/19/21 2220 98.1 °F (36.7 °C) 87 18 (!) 111/92 94 %   08/19/21 2130 98.1 °F (36.7 °C) 87 19 112/77 93 %   08/19/21 1914 98.1 °F (36.7 °C) -- -- -- --   08/19/21 1911 -- 88 14 135/88 96 %   08/19/21 1908 -- 84 14 -- 96 %       Intake/Output Summary (Last 24 hours) at 8/20/2021 1540  Last data filed at 8/20/2021 1050  Gross per 24 hour   Intake 560 ml   Output 500 ml   Net 60 ml        I had a face to face encounter and independently examined this patient on 8/20/2021, as outlined below:  PHYSICAL EXAM:  General: WD, WN. Alert, cooperative, no acute distress    EENT:  EOMI. Anicteric sclerae. MMM  Resp:  CTA bilaterally, no wheezing or rales. No accessory muscle use  CV:  Regular  rhythm,  No edema  GI:  Soft, Non distended, Non tender. +Bowel sounds  Neurologic:  Alert and oriented X 3, normal speech,   Psych:   Good insight. Not anxious nor agitated  Skin:  No rashes.   No jaundice    Reviewed most current lab test results and cultures  YES  Reviewed most current radiology test results   YES  Review and summation of old records today    NO  Reviewed patient's current orders and MAR    YES  PMH/SH reviewed - no change compared to H&P  ________________________________________________________________________  Care Plan discussed with:    Comments   Patient y    Family  y    RN y    Care Manager     Consultant                        Multidiciplinary team rounds were held today with , nursing, pharmacist and clinical coordinator. Patient's plan of care was discussed; medications were reviewed and discharge planning was addressed. ________________________________________________________________________  Total NON critical care TIME:  35   Minutes    Total CRITICAL CARE TIME Spent:   Minutes non procedure based      Comments   >50% of visit spent in counseling and coordination of care     ________________________________________________________________________  Mamta Moreno MD     Procedures: see electronic medical records for all procedures/Xrays and details which were not copied into this note but were reviewed prior to creation of Plan. LABS:  I reviewed today's most current labs and imaging studies.   Pertinent labs include:  Recent Labs     08/20/21  0327 08/19/21  1905   WBC 10.4 7.9   HGB 14.7 15.6   HCT 43.8 47.8    208     Recent Labs     08/20/21  0327 08/19/21  1905    138   K 3.8 3.6    106   CO2 24 25   * 147*   BUN 15 12   CREA 0.95 1.05   CA 8.7 9.1   MG 2.0  --    ALB  --  3.8   TBILI  --  0.7   ALT  --  87*   INR  --  1.0       Signed: Mamta Moreno MD

## 2021-08-20 NOTE — PROGRESS NOTES
Transition of Care Plan:    RUR: 9% - low risk  Disposition: Home  Follow up appointments: PCP, Cardiology  DME needed:No needs identified. Transportation at [place] [address] or brother (Oumar Fernandez)  [place] or means to access home:   yes     IM Medicare Letter: N/A  Is patient a BCPI-A Bundle:  No         If yes, was Bundle Letter given?: N/A   Caregiver Contact: Kwesi Wynn wife, [phone]  Discharge Caregiver contacted prior to discharge? CM to contact prior to discharge, if pt requests. Reason for Admission:  NSTEMI                  RUR Score:    9% - low risk                 Plan for utilizing home health:    No needs identified. PCP: First and Last name:  Jordan Rios MD   Name of Practice: [facility]   Are you a current patient: Yes/No: yes   Approximate date of last visit: 7/2021   Can you participate in a virtual visit with your PCP: No                    Current Advanced Directive/Advance Care Plan: Full Code   V[place] 13 (ACP) Conversation      Date of Conversation: 8/20/2021  Conducted with: Patient with Decision Making Capacity    Healthcare Decision Maker:   Kwesi Wynn, wife, [phone]    Today we documented Decision Maker(s) consistent with Legal Next of Kin hierarchy. No ACP. States his wife is his primary decision maker. CM gave pt a blank ACP. He states he wants to have his wife look at it. CM informed him that we can assist when he wants to fill out. Primary RN made aware.      Content/Action Overview:   Has NO ACP documents/care preferences - information provided, considering goals and options  Reviewed DNR/DNI and patient elects Full Code (Attempt Resuscitation)    Length of Voluntary ACP Conversation in minutes:  <16 minutes (Non-Billable)    Healthcare Decision Maker:   Kwesi Wynn, wife, [phone]               Transition of Care Plan:   Home    CM met with patient at the bedside to complete initial assessment. CM introduced role, verified demographics, and discussed discharge planning. Mr. Frantz Armendariz is a 57year old male admitted for NSTEMI. He is insured by [employer]. He uses the [facility] [address] on [address] [employer] [address] [employer]. Pt lives with his wife in a 1-story home that has 3 steps to enter. He is independent with ADLs and IADLs. Pt drives. Pt denies any DME or hx of HH, SNF, or Inpt Rehab. At time of discharge, pt will be transported home by his wife or brother Pradip Brooke. Unit CM will continue to follow. Care Management Interventions  PCP Verified by CM: Yes (Dr. Virginia Li. Last appt 7/2021.)  Mode of Transport at Discharge:  Other (see comment) (wife ro brother)  Transition of Care Consult (CM Consult): Discharge Planning  Discharge Durable Medical Equipment: No  Physical Therapy Consult: No  Occupational Therapy Consult: No  Speech Therapy Consult: No  Current Support Network: Lives with Spouse  Confirm Follow Up Transport: Self (If pt cannot drive, family can transport)  The Plan for Transition of Care is Related to the Following Treatment Goals : home  Discharge Location  Discharge Placement: Home    Bay Khan RN, BSN, [address] [facility] Care Manager  [phone]

## 2021-08-20 NOTE — ADT AUTH CERT NOTES
PLEASE BUILD AN AUTHORIZATION FOR THIS PATIENT- ADMITTED ON 2021 AND STILL IN HOUSE- PLEASE FAX BACK AN AUTHORIZATION UPDATE AND PENDING REFERENCE NUMBER TO ME AT:[phone] [facility]     FACILITY NPI :[id number]  FACILITY TAX ID : [id number]     [address]  \[facility] [place] [facility]\"" [address] [facility]  [place] [address]  [phone]            Patient Name :Duane Newman   : 1960 (57 yrs)  MRN : [id number]     Patient Mailing Address [address] [47] , [address]                                                               .         Insurance Plan Payor: Valarie Burris / Plan: [address] [place] [address] / Product Type: PPO /      Primary Coverage Subscriber ID : [id number]     Secondary Coverage:  N/A     Secondary Subscriber ID :        Current Patient Class : INPATIENT  Admit Date : 2021     REQUESTED LEVEL OF CARE: INPATIENT [101]                                                           Diagnosis : NSTEMI (non-ST elevated myocardial infarction) ([facility]                          ICD10 Code : NSTEMI (non-ST elevated myocardial infarction) ([facility] [I21.4]     Current Room and Bed 0/     Admitting and Attending Info:  Admitting Provider : Yo Meidna MD   NPI: [id number]  Admitting Provider Phone.  [phone]  Admitting Provider Address: [address] [place] [address], [address]     Attending Provider Merissa Chávez MD   [id number]  Attending Provider Address:  [address] [place] [address]     Attending Provider Phone: Attending phys phone: [phone]      Comments  Comment     Last edited by  on  [employer] [address] Name: [address]                    Patient Demographics    Patient Name   Bryna Canavan Legal Sex   Male    1960 Address   [address] [address] Phone   [phone] [facility]   [phone] [facility]   Hospital Account    Name Acct ID Class Status Primary Coverage   Bryna Canavan [id number] INPATIENT Open BLUE CROSS - [facility]          Guarantor Account (for Hospital Account [de-identified])    Name Relation to Pt Service Area Active? Acct Type   Bryna Canavan Self [facility] Yes Personal/Family   Address Phone     [address] [place], [facility]. [address] [phone](N)            Coverage Information (for Hospital Account [de-identified])    F/O Payor/Plan Subscriber  Subscriber Sex Precert #   BLUE CROSS/VA BLUE CROSS OUT OF STATE [facility]    Subscriber Subscriber #   Bryna Canavan [id number]   Grp # Group Name   [id number]    Address Phone   [address]    Policy Number Status Effective Date Benefits Phone   [id number] -  -   Auth/Cert             Diagnosis     Codes Comments   NSTEMI (non-ST elevated myocardial infarction) [facility])  ICD-10-CM: I21.4   ICD-9-CM: 410.70           Admission Information    Arrival Date/Time: 2021 1904 Admit Date/Time: 2021 1904 IP Adm.  Date/Time: 2021   Admission Type: Emergency Point of Origin: Non-health Care Facility/self Admit Category: Home   Means of Arrival: Ambulance Primary Service: Medicine Secondary Service: N/A   Transfer Source:  Service Area: [facility] Unit: [facility] 2 INTRVNTNL CARDIO   Admit Provider: Dior Pink MD Attending Provider: Javier Garay MD Referring Provider:    Admission Information    Attending Provider Admission Dx Admitted on   Nila Franz MD NSTEMI (non-ST elevated myocardial infarction) [facility] 21   Service Isolation Code Status   MEDICINE  Full Code   Allergies Advance Care Planning    No Known Allergies Jump to the Activity     Admission Information    Unit/Bed: [facility] 2 [facility] CARDIO Service: MEDICINE   Admitting provider: Hayward Spurling, MD Phone: [phone]   Attending provider: Marielena Acevedo MD Phone: [phone]   PCP: Chasity Juarez MD Phone: [phone]   Admission dx:  Patient class: I   Admission type: ER     Patient Demographics    Patient Name   Roxana Merlos   [id number] Legal Sex   Male    1960 Address   [address] [place] [address] Phone   [phone] [facility])   [phone] (Mobile)   H&P Notes     H&P by Carlie Freeman MD at 21 documented on ED to Hosp-Admission (Current) from 2021 in [facility] 2 [facility] CARDIO  Author: Carlie Freeman MD Author Type: Physician Filed: 21   Note Status: Signed Cosign: Cosign Not Required Date of Service: 21   : Carlie Freeman MD (Physician)   [facility]                    Hospitalist Admission Note     NAME:            Patrick Quintero   :               1960   MRN:               [id number]      Date/Time:      2021 9:41 PM     Patient PCP: Chasity Juarez MD  ______________________________________________________________________  Given the patient's current clinical presentation, I have a high level of concern for decompensation if discharged from the emergency department.  Complex decision making was performed, which includes reviewing the patient's available past medical records, laboratory results, and x-ray films.        My assessment of this patient's clinical condition and my plan of care is as follows.     Assessment / Plan:     Chest pain  NSTEMI  -Troponin-1.66, trending another one.  -Chest x-ray-no acute abnormality.  -Started on nitro drip.  -Started on heparin drip.  -Cardiology consulted.  -NPO.   -Lipid panel, A1c in a.m.  -Echo pending.     Hypertension  -Holding the home blood pressure medication because patient is on nitro drip.     GERD  -Continue with the home medications     Code Status: Full code  Surrogate Decision Maker: Wife     DVT Prophylaxis: Heparin  GI Prophylaxis: not indicated     Baseline: Ambulatory at home                 Subjective:   CHIEF COMPLAINT: Chest pain     HISTORY OF PRESENT ILLNESS:     Ashley Perez is a 57 y.o.  male who presents with chest pain that started today in the evening around 5:00. Patient past medical history of hypertension. Patient stated that today in the evening he started having chest pain located left side of her chest radiating to the left arm 10/10 in severity, constant in nature, no association with nausea and vomiting. Denies any diaphoresis, no shortness of breath, no fever or chills. No similar complaints in the past.  Patient still having chest pain.     We were asked to admit for work up and evaluation of the above problems.           Past Medical History:   Diagnosis Date    Hypertension                 Past Surgical History:   Procedure Laterality Date    CA ABDOMEN SURGERY PROC UNLISTED         hernia         Social History            Tobacco Use    Smoking status: Former Smoker       Packs/day: 1.00       Quit date: 2019       Years since quittin.0   Substance Use Topics    Alcohol use: No               Family History   Problem Relation Age of Onset    Heart Attack Mother        No Known Allergies              Prior to Admission medications    Medication Sig Start Date End Date Taking?  Authorizing Provider   butalbital-acetaminophen-caff (FIORICET) -40 mg per capsule May take 1 to 2 caps every 6 hours as needed for pain 18     Solitario Alberto   olmesartan (BENICAR) 20 mg tablet Take 20 mg by mouth daily.       Darrell Hartmann MD   fexofenadine (ALLEGRA) 180 mg tablet Take 60 mg by mouth daily.       Darrell Hartmann MD   ciprofloxacin (CIPRO) 500 mg tablet Take 500 mg by mouth two (2) times a day.       Darrell Hartmann MD   ibuprofen (MOTRIN) 200 mg tablet Take 400 mg by mouth every six (6) hours as needed.       Darrell Hartmann MD   potassium chloride (KLOR-CON M20) 20 mEq tablet Take 20 mEq by mouth two (2) times a day.       Darrell Hartmann MD   omeprazole (PRILOSEC) 40 mg capsule Take 40 mg by mouth daily.       Darrell Hartmann MD         REVIEW OF SYSTEMS:     I am not able to complete the review of systems because:    The patient is intubated and sedated     The patient has altered mental status due to his acute medical problems     The patient has baseline aphasia from prior stroke(s)     The patient has baseline dementia and is not reliable historian     The patient is in acute medical distress and unable to provide information               Total of 12 systems reviewed as follows:                                        POSITIVE= underlined text  Negative = text not underlined  General:                     fever, chills, sweats, generalized weakness, weight loss/gain,                                       loss of appetite   Eyes:                           blurred vision, eye pain, loss of vision, double vision  ENT:                            rhinorrhea, pharyngitis   Respiratory:               cough, sputum production, SOB, TREJO, wheezing, pleuritic pain   Cardiology:                chest pain, palpitations, orthopnea, PND, edema, syncope   Gastrointestinal:       abdominal pain , N/V, diarrhea, dysphagia, constipation, bleeding   Genitourinary:           frequency, urgency, dysuria, hematuria, incontinence   Muskuloskeletal :      arthralgia, myalgia, back pain  Hematology:              easy bruising, nose or gum bleeding, lymphadenopathy   Dermatological:         rash, ulceration, pruritis, color change / jaundice  Endocrine:                 hot flashes or polydipsia   Neurological:             headache, dizziness, confusion, focal weakness, paresthesia,                                      Speech difficulties, memory loss, gait difficulty  Psychological:          Feelings of anxiety, depression, agitation     Objective:   VITALS:    Visit Vitals  /77   Pulse 87   Temp 98.1 °F (36.7 °C)   Resp 19   Ht 5' 9\" (1.753 m)   Wt 99.8 kg (220 lb)   SpO2 93%   BMI 32.49 kg/m²         PHYSICAL EXAM:     General:          Alert, cooperative, no distress, appears stated age. HEENT:           Atraumatic, anicteric sclerae, pink conjunctivae                          No oral ulcers, mucosa moist, throat clear, dentition fair  Neck:               Supple, symmetrical,  thyroid: non tender  Lungs:             Clear to auscultation bilaterally. No Wheezing or Rhonchi. No rales. Chest wall:      No tenderness  No Accessory muscle use. Heart:              Regular  rhythm,  No  murmur   No edema  Abdomen:        Soft, non-tender. Not distended. Bowel sounds normal  Extremities:     No cyanosis. No clubbing,                            Skin turgor normal, Capillary refill normal, Radial dial pulse 2+  Skin:                Not pale. Not Jaundiced  No rashes   Psych:             Good insight. Not depressed. Not anxious or agitated. Neurologic:      EOMs intact. No facial asymmetry. No aphasia or slurred speech. Symmetrical strength, Sensation grossly intact.  Alert and oriented X 4.      _______________________________________________________________________  Care Plan discussed with:      Comments   Patient x     Family  x     RN x     Care Manager                      Consultant:  x     _______________________________________________________________________  Expected  Disposition:   Home with Family x   HH/PT/OT/RN     SNF/LTC     OBDULIA     ________________________________________________________________________  TOTAL TIME:  61 Minutes     Critical Care Provided     Minutes non procedure based         Comments       Reviewed previous records   >50% of visit spent in counseling and coordination of care   Discussion with patient and/or family and questions answered         ________________________________________________________________________  Signed: Abdias Nam MD     Procedures: see electronic medical records for all procedures/Xrays and details which were not copied into this note but were reviewed prior to creation of Plan.     LAB DATA REVIEWED:    Recent Results          Recent Results (from the past 24 hour(s))   CBC WITH AUTOMATED DIFF     Collection Time: 08/19/21  7:05 PM   Result Value Ref Range     WBC 7.9 4.1 - 11.1 K/uL     RBC 5.55 4.10 - 5.70 M/uL     HGB 15.6 12.1 - 17.0 g/dL     HCT 47.8 36.6 - 50.3 %     MCV 86.1 80.0 - 99.0 FL     MCH 28.1 26.0 - 34.0 PG     MCHC 32.6 30.0 - 36.5 g/dL     RDW 13.8 11.5 - 14.5 %     PLATELET 773 927 - 028 K/uL     MPV 10.3 8.9 - 12.9 FL     NRBC 0.0 0  WBC     ABSOLUTE NRBC 0.00 0.00 - 0.01 K/uL     NEUTROPHILS 68 32 - 75 %     LYMPHOCYTES 18 12 - 49 %     MONOCYTES 7 5 - 13 %     EOSINOPHILS 5 0 - 7 %     BASOPHILS 1 0 - 1 %     IMMATURE GRANULOCYTES 1 (H) 0.0 - 0.5 %     ABS. NEUTROPHILS 5.4 1.8 - 8.0 K/UL     ABS. LYMPHOCYTES 1.4 0.8 - 3.5 K/UL     ABS. MONOCYTES 0.6 0.0 - 1.0 K/UL     ABS. EOSINOPHILS 0.4 0.0 - 0.4 K/UL     ABS. BASOPHILS 0.1 0.0 - 0.1 K/UL     ABS. IMM. GRANS. 0.1 (H) 0.00 - 0.04 K/UL     DF AUTOMATED     METABOLIC PANEL, COMPREHENSIVE     Collection Time: 08/19/21  7:05 PM   Result Value Ref Range     Sodium 138 136 - 145 mmol/L     Potassium 3.6 3.5 - 5.1 mmol/L     Chloride 106 97 - 108 mmol/L     CO2 25 21 - 32 mmol/L     Anion gap 7 5 - 15 mmol/L     Glucose 147 (H) 65 - 100 mg/dL     BUN 12 6 - 20 MG/DL     Creatinine 1.05 0.70 - 1.30 MG/DL     BUN/Creatinine ratio 11 (L) 12 - 20       GFR est AA >60 >60 ml/min/1.73m2     GFR est non-AA >60 >60 ml/min/1.73m2     Calcium 9.1 8.5 - 10.1 MG/DL     Bilirubin, total 0.7 0.2 - 1.0 MG/DL     ALT (SGPT) 87 (H) 12 - 78 U/L     AST (SGOT) 65 (H) 15 - 37 U/L     Alk.  phosphatase 74 45 - 117 U/L     Protein, total 7.5 6.4 - 8.2 g/dL     Albumin 3.8 3.5 - 5.0 g/dL     Globulin 3.7 2.0 - 4.0 g/dL     A-G Ratio 1.0 (L) 1.1 - 2.2     CK W/ REFLX CKMB     Collection Time: 08/19/21  7:05 PM   Result Value Ref Range      (H) 39 - 308 U/L   TROPONIN I     Collection Time: 08/19/21  7:05 PM   Result Value Ref Range     Troponin-I, Qt. 1.66 (H) <0.05 ng/mL   SAMPLES BEING HELD     Collection Time: 08/19/21  7:05 PM   Result Value Ref Range     SAMPLES BEING HELD [facility],RD       COMMENT           Add-on orders for these samples will be processed based on acceptable specimen integrity and analyte stability, which may vary by analyte.    PROTHROMBIN TIME + INR     Collection Time: 08/19/21  7:05 PM   Result Value Ref Range     INR 1.0 0.9 - 1.1       Prothrombin time 10.6 9.0 - 11.1 sec   PTT     Collection Time: 08/19/21  7:05 PM   Result Value Ref Range     aPTT 25.9 22.1 - 31.0 sec     aPTT, therapeutic range     58.0 - 77.0 SECS   CK-MB,QUANT.     Collection Time: 08/19/21  7:05 PM   Result Value Ref Range     CK - MB 19.8 (H) <3.6 NG/ML     CK-MB Index 4.1 (H) 0.0 - 2.5     EKG, 12 LEAD, INITIAL     Collection Time: 08/19/21  7:05 PM   Result Value Ref Range     Ventricular Rate 89 BPM     Atrial Rate 89 BPM     P-R Interval 166 ms     QRS Duration 118 ms     Q-T Interval 380 ms     QTC Calculation (Bezet) 462 ms     Calculated P Axis 39 degrees     Calculated R Axis -36 degrees     Calculated T Axis 94 degrees     Diagnosis           Sinus rhythm with premature atrial complexes  Left axis deviation  Left ventricular hypertrophy with QRS widening and repolarization abnormality  Cannot rule out Septal infarct , age undetermined  No previous ECGs available      POC TROPONIN-I     Collection Time: 08/19/21  7:09 PM   Result Value Ref Range     Troponin-I (POC) 0.45 (H) 0.00 - 0.08 ng/mL   EKG, 12 LEAD, SUBSEQUENT     Collection Time: 08/19/21  8:34 PM   Result Value Ref Range     Ventricular Rate 81 BPM     Atrial Rate 81 BPM     P-R Interval 170 ms     QRS Duration 96 ms     Q-T Interval 370 ms     QTC Calculation (Bezet) 429 ms     Calculated P Axis 40 degrees     Calculated R Axis -28 degrees     Calculated T Axis 75 degrees     Diagnosis           Normal sinus rhythm with sinus arrhythmia  Normal ECG  When compared with ECG of 19-AUG-2021 19:05,  MANUAL COMPARISON REQUIRED, DATA IS UNCONFIRMED                Patient Demographics    Patient Name   Khalida Stack   [id number] Legal Sex   Male    1960 Address   [address] [place] [address] Phone   [phone] (Home)   [phone] (Mobile)   CSN:   [id number]   Admit Date: Admit Time Room Bed   Aug 19, 2021  7:04 PM 2160 [[id number]]  [[id number]]   Attending Providers    Provider Pager From To   Kieran Cohen MD  21   Juan Minor MD  21   Cristina Whitfield MD  21   Juan Minor MD  21   Inge Ríos MD  21    Emergency Contact(s)    Name Relation Home Work Mobile   Sruthi Masters Spouse [address] [facility]   [place]   [phone]   Utilization Reviews       Myocardial Infarction - Care Day 2 (2021) by Wilfredo Sick       Review Entered Review Status   2021 14:49 Completed      Criteria Review      Care Day: 2 Care Date: 2021 Level of Care: Telemetry    Guideline Day 2    Clinical Status    (X) * Hemodynamic stability    2021 14:49:34 EDT by Tiffanie Galnido      96/59, 75, 98.0, 17, O2 sat 88%-93% on 2lpm via NC. 100.3kg    (X) * Chest pain, dyspnea, or anginal equivalent absent    2021 14:49:34 EDT by Tiffanie Galindo      no c/o chest pain    Activity    (X) Activity as tolerated    2021 14:49:35 EDT by Umm Cheung with asst, after bedrest period completed s/p LHC with PCI    Routes    (X) * Oral hydration    2021 14:49:35 EDT by Tiffanie Galindo      NS at 75cc/hr off at 1045 and po diet started (X) * Oral medications    8/20/2021 14:49:35 EDT by Mary Birmingham protonix 40mg po qd, tylenol 650mg po q6H PRN x1    (X) Parenteral medications    8/20/2021 14:49:35 EDT by Orlando Mota      Morphine 2mg IV x2    (X) Oral diet as tolerated    8/20/2021 14:49:35 EDT by Aldo Ji 3 carb choices, low fate, low chol, high fiber, 2g Na diet    Interventions    (X) * Oxygen absent    8/20/2021 14:49:35 EDT by Lorita Flax      O2 weaned to RA at 0915    (X) ECG, cardiac biomarkers    8/20/2021 14:49:35 EDT by Lorita Flax      12 lead EKG s/p PCI: NSR, rate 70. Trop 48.00 at 0327. Gluc 146. aPTT 32.6.    (X) Possible cardiac angiography, with PCI if indicated    8/20/2021 14:49:35 EDT by Lorita Flax      LHC: FIND: NSTEMI. Thrombectomy, insert stent, PCI. 90% ulcerated thrombotic prox LAD stenosis req [facility] thrombectomy and stent x1. Mod 50% tubular stenosis in prox RCA. ELevated LVEDP 27-29 mmHg. Rec: ECHO, asa daily indefinitely. Brilinta bid x1yr. Medications    (X) * IV nitroglycerin absent    8/20/2021 14:49:35 EDT by Lorita Flax      Nitroglycerin drip cont and off at 0300    (X) Anticoagulants    8/20/2021 14:49:35 EDT by Lorita Flax      Heparin drip off at 0300    (X) Antiplatelet agents (eg, aspirin, clopidogrel, ticagrelor)    8/20/2021 14:49:35 EDT by Lorita Flax      brilinta 180mg po x1 and 90mg po q12H; antiomax drip from 2010-1499; asa 81mg po qd    (X) Beta-blocker    8/20/2021 14:49:35 EDT by Lorita Flax      lopressor 25mg po bid    (X) Possible ACE inhibitor or ARB    8/20/2021 14:49:35 EDT by Lorita Flax      cozaar 25mg po qd    (X) Statin    8/20/2021 14:49:35 EDT by Orlando Mota      crestor 20mg po qhs    * Milestone   Additional Notes   Assessment:       1.  NSTEMI, s/p PCI of 90% ulcerated prox LAD    2.  History of hypertension. 3.  Gastroesophageal reflux. 4.  History of nephrolithiasis.    5.  History of tobacco abuse with none for 2 years. 6.  elevated LVEDP at cath, unknown EF    7. Hematuria        Plan: Await echo. Add ARB, Check lipid profile. Monitor hematuria for now- good UO          Physical Exam:   VS as above   Chest CTA   Card RRR No MRG   Neuro awake and alert       Myocardial Infarction - Care Day 1 (8/19/2021) by Alex Suggs       Review Entered Review Status   8/20/2021 11:26 Completed      Criteria Review      Care Day: 1 Care Date: 8/19/2021 Level of Care: Telemetry    Guideline Day 1    Level Of Care    (X) ICU or intermediate care after emergency treatment    8/20/2021 11:26:56 EDT by Santosh Almeida      Tele    Clinical Status    (X) * Clinical Indications met    8/20/2021 11:26:56 EDT by Bee Aggarwal old male with PMH HTN to ED c/o chest pain to left side of chest, radiating to left arm and rating at 10/10. 111/92, 99.0, 81, 18, RA sat 93%-placed on O2 at 2lpm via NC. 99.8kg. US retroperitoneum: neg. Routes    ( ) Oral hydration    8/20/2021 11:26:56 EDT by Siena Lombardi NS at 75cc/hr cont    ( ) Clear liquid diet    8/20/2021 11:26:56 EDT by Santosh Almeida      NPO    Interventions    (X) ECG, cardiac biomarkers    8/20/2021 11:26:56 EDT by Santosh Almeida      12 lead EKG: SR with PAC, rate 89. Trop 1.66 at 1905 and 25.20 at 2235.  Gluc 147, alt 87, ast 65, ck 480, CK MB 19.8.    (X) CXR    8/20/2021 11:26:56 EDT by Santosh Almeida      CXR: no acute findings    (X) Oxygen    8/20/2021 11:26:56 EDT by Santosh Almeida      O2 at 2lpm via NC    Medications    (X) Possible IV morphine    8/20/2021 11:26:56 EDT by Santosh Almeida      morphine 2mg IV x2    (X) Anticoagulants    8/20/2021 11:26:56 EDT by Santosh Almeida      Heparin drip at 10un/kg/hr    (X) Possible nitroglycerin    8/20/2021 11:26:56 EDT by Santosh Almeida      NItrostat 0.4mg SL x1 and then Nitroglycerin drip at 10-30mcg/min IV    * Milestone   Additional Notes   Assessment / Plan:       Chest pain   NSTEMI -Troponin-1.66, trending another one.   -Chest x-ray-no acute abnormality.   -Started on nitro drip.   -Started on heparin drip.   -Cardiology consulted.   -NPO. -Lipid panel, A1c in a.m.   -Echo pending.       Hypertension   -Holding the home blood pressure medication because patient is on nitro drip.       GERD   -Continue with the home medications      PHYSICAL EXAM:    General:          Alert, cooperative, no distress, appears stated age.      HEENT:           Atraumatic, anicteric sclerae, pink conjunctivae                           No oral ulcers, mucosa moist, throat clear, dentition fair   Neck:               Supple, symmetrical,  thyroid: non tender   Lungs:             Clear to auscultation bilaterally.  No Wheezing or Rhonchi. No rales. Chest wall:      No tenderness  No Accessory muscle use. Heart:              Regular  rhythm,  No  murmur   No edema   Abdomen:        Soft, non-tender. Not distended.  Bowel sounds normal   Extremities:     No cyanosis.  No clubbing,                             Skin turgor normal, Capillary refill normal, Radial dial pulse 2+   Skin:                Not pale.  Not Jaundiced  No rashes    Psych:             Good insight.  Not depressed.  Not anxious or agitated. Neurologic:      EOMs intact. No facial asymmetry. No aphasia or slurred speech. Symmetrical strength, Sensation grossly intact.  Alert and oriented X 4.       Myocardial Infarction - Clinical Indications for Admission to Inpatient Care by Blaze Sheehan Entered Review Status   8/20/2021 11:17 Completed      Criteria Review      Clinical Indications for Admission to Inpatient Care    Most Recent : Marsha Edward Most Recent Date: 8/20/2021 11:17:11 EDT    (X) Admission is indicated for  1 or more  of the following  (1) (2) (3) (4) (5) (6) (7) (8) (9):       (X) Acute myocardial infarction (MI) [A] (not in context of cardiac procedure within last 48 hours),       [facility]  of the following  (11):          (X) Elevated cardiac troponin level, as indicated by  1 or more  of the following :             (X) New or presumed new elevation of cardiac troponin level (ie, elevation clearly not due to             chronic condition, see footnotes) [B] [C] [D]             8/20/2021 11:17:11 EDT by Dana Carr               troponin 1.66 at 1905 and 25.20 at 2235          (X) Myocardial injury due to acute ischemia, as indicated by  1 or more  of the following :             (X) Symptoms consistent with myocardial ischemia (eg, chest pain, dyspnea)             8/20/2021 11:17:11 EDT by Dana Carr               Chest pain started at 1700 today rating 10/10, constant, left side of chest radiating to left arm    Notes:    8/20/2021 11:17:11 EDT by Dana Carr    Subject: Additional Clinical Information      * Pt is Rollo Juan Alberto old female with PMH HTN to ED c/o chest pain as documented noted to have elevated cardiac enzymes and diagnosed with NSTEMI. Admitted.

## 2021-08-20 NOTE — CONSULTS
[facility]    Name:  Nely Moralez  MR#:  [id number]  :  1960  ACCOUNT #:  [de-identified]  DATE OF SERVICE:  2021    REQUESTING PHYSICIAN:  Shanta Sutherland MD    REASON FOR CONSULTATION:  Evaluate chest pain. CHIEF COMPLAINT:  Chest pain. HISTORY OF PRESENT ILLNESS:  The patient is a 59-year-old man with a history of hypertension, but no prior cardiac history. The patient presented to the ER having been brought in by rescue squad with complaints of left-sided chest discomfort, which began around 05:30. The symptoms persisted and there was some radiation up into the shoulders and some numbness in the arms. [facility] was [facility] called that this was an inferior ST elevation MI, but on arrival, review of the EKGs did not show a STEMI pattern. The patient was subsequently treated with medications and initial troponin was 1.66. Repeat EKG did not show any changes. There was some evidence of LVH with some QRS widening, but a repeat EKG was essentially normal.  I was initially called for evaluation and was present at the time of the patient's initial arrival to the ER. The patient has been started on IV heparin and has received nitrates. In terms of other risk factors, he quit smoking about 2 years ago and does have a family history of heart disease. Lipid status is unknown and the patient is not diabetic. PAST MEDICAL HISTORY:  As noted above, gastroesophageal reflux, nephrolithiasis. SURGERY:  Prior hernia repair. CURRENT MEDICATIONS:  Include apparently Benicar and Prilosec. SOCIAL HISTORY:  The patient is a former smoker and quit about 2 years ago. He lives in [place].    FAMILY HISTORY:  The patient's mother had a heart attack and his brother had a cardiac aneurysm resection and apparently has coronary disease. REVIEW OF SYSTEMS:  As noted above, otherwise noncontributory.     PHYSICAL EXAMINATION:  GENERAL:  Exam reveals a middle-aged white male in mild distress. VITAL SIGNS:  Blood pressure 135/88, pulse 88, respirations 14, temperature 98. 1. HEENT:  Pupils are equal.  Oropharynx moist oral mucosa. NECK:  Supple. No masses or thyromegaly. No cervical or supraclavicular adenopathy. No carotid bruits. CHEST:  Clear. No wheezes or crackles. SKIN:  Warm and dry. CARDIAC:  Regular rate and rhythm. No obvious murmurs, rubs, gallops or clicks. ABDOMEN:  Soft, nontender, no masses or organomegaly. Bowel sounds positive. EXTREMITIES:  No cyanosis, clubbing or edema. Distal pulses 1+ in the feet bilaterally. NEURO:  No obvious gross motor deficits. LABORATORY DATA:  Troponin 1.66, hemoglobin 15.6, white count 7.9, BUN 12, creatinine 1.05. Chest x-ray negative. EKG sinus rhythm, LVH by voltage, nonspecific intraventricular conduction delay, no obvious ST elevations. IMPRESSION:  1. Acute coronary syndrome. 2.  History of hypertension. 3.  Gastroesophageal reflux. 4.  History of nephrolithiasis. 5.  History of tobacco abuse with none for 2 years. RECOMMENDATIONS:  The patient has been started on heparin and will receive additional nitroglycerin and will be started on IV nitro. We will also start metoprolol. If he becomes clinically unstable or continues to have intractable chest pain, we will take him directly to the cath lab tonight. Otherwise, we will plan on catheterization in the morning. We will check serial enzymes as well as a repeat EKG.         Rosa Elmore MD      [id number]  D:  08/19/2021 21:57  T:  08/19/2021 23:03  JOB #:  [id number]  CC:  MD Emelia Nicholson MD

## 2021-08-20 NOTE — PROGRESS NOTES
Received call from Dr. Mignon Mcadams. RN notified MD of pt. Condition, continue left CP 5-6/10 and received order for 2 mg morphine 2 mg IV and increased maximum dose of Nitroglycerin gtt to 50 mcg/min.

## 2021-08-20 NOTE — H&P
Hospitalist Admission Note    NAME: Dipika Schneider   :  1960   MRN:  [id number]     Date/Time:  2021 9:41 PM    Patient PCP: Jordan Rios MD  ______________________________________________________________________  Given the patient's current clinical presentation, I have a high level of concern for decompensation if discharged from the emergency department. Complex decision making was performed, which includes reviewing the patient's available past medical records, laboratory results, and x-ray films. My assessment of this patient's clinical condition and my plan of care is as follows. Assessment / Plan:    Chest pain  NSTEMI  -Troponin-1.66, trending another one.  -Chest x-ray-no acute abnormality.  -Started on nitro drip.  -Started on heparin drip.  -Cardiology consulted.  -NPO. -Lipid panel, A1c in a.m.  -Echo pending. Hypertension  -Holding the home blood pressure medication because patient is on nitro drip. GERD  -Continue with the home medications    Code Status: Full code  Surrogate Decision Maker: Wife    DVT Prophylaxis: Heparin  GI Prophylaxis: not indicated    Baseline: Ambulatory at home      Subjective:   CHIEF COMPLAINT: Chest pain    HISTORY OF PRESENT ILLNESS:     Dipika Schneider is a 57 y.o.  male who presents with chest pain that started today in the evening around 5:00. Patient past medical history of hypertension. Patient stated that today in the evening he started having chest pain located left side of her chest radiating to the left arm 10/10 in severity, constant in nature, no association with nausea and vomiting. Denies any diaphoresis, no shortness of breath, no fever or chills. No similar complaints in the past.  Patient still having chest pain. We were asked to admit for work up and evaluation of the above problems.      Past Medical History:   Diagnosis Date    Hypertension         Past Surgical History:   Procedure Laterality Date    [place] ABDOMEN SURGERY PROC UNLISTED      hernia       Social History     Tobacco Use    Smoking status: Former Smoker     Packs/day: 1.00     Quit date: 2019     Years since quittin.0   Substance Use Topics    Alcohol use: No        Family History   Problem Relation Age of Onset    Heart Attack Mother      No Known Allergies     Prior to Admission medications    Medication Sig Start Date End Date Taking? Authorizing Provider   butalbital-acetaminophen-caff (FIORICET) -40 mg per capsule May take 1 to 2 caps every 6 hours as needed for pain 18   Solitario Saravia   olmesartan (BENICAR) 20 mg tablet Take 20 mg by mouth daily. Darrell Hartmann MD   fexofenadine (ALLEGRA) 180 mg tablet Take 60 mg by mouth daily. Darrell Hartmann MD   ciprofloxacin (CIPRO) 500 mg tablet Take 500 mg by mouth two (2) times a day. Darrell Hartmann MD   ibuprofen (MOTRIN) 200 mg tablet Take 400 mg by mouth every six (6) hours as needed. Darrell Hartmann MD   potassium chloride (KLOR-CON M20) 20 mEq tablet Take 20 mEq by mouth two (2) times a day. Darrell Hartmann MD   omeprazole (PRILOSEC) 40 mg capsule Take 40 mg by mouth daily. Darrell Hartmann MD       REVIEW OF SYSTEMS:     I am not able to complete the review of systems because:    The patient is intubated and sedated    The patient has altered mental status due to his acute medical problems    The patient has baseline aphasia from prior stroke(s)    The patient has baseline dementia and is not reliable historian    The patient is in acute medical distress and unable to provide information           Total of 12 systems reviewed as follows:       POSITIVE= underlined text  Negative = text not underlined  General:  fever, chills, sweats, generalized weakness, weight loss/gain,      loss of appetite   Eyes:    blurred vision, eye pain, loss of vision, double vision  ENT:    rhinorrhea, pharyngitis   Respiratory:   cough, sputum production, SOB, TREJO, wheezing, pleuritic pain   Cardiology:   chest pain, palpitations, orthopnea, PND, edema, syncope   Gastrointestinal:  abdominal pain , N/V, diarrhea, dysphagia, constipation, bleeding   Genitourinary:  frequency, urgency, dysuria, hematuria, incontinence   Muskuloskeletal :  arthralgia, myalgia, back pain  Hematology:  easy bruising, nose or gum bleeding, lymphadenopathy   Dermatological: rash, ulceration, pruritis, color change / jaundice  Endocrine:   hot flashes or polydipsia   Neurological:  headache, dizziness, confusion, focal weakness, paresthesia,     Speech difficulties, memory loss, gait difficulty  Psychological: Feelings of anxiety, depression, agitation    Objective:   VITALS:    Visit Vitals  /77   Pulse 87   Temp 98.1 °F (36.7 °C)   Resp 19   Ht 5' 9\" (1.753 m)   Wt 99.8 kg (220 lb)   SpO2 93%   BMI 32.49 kg/m²       PHYSICAL EXAM:    General:    Alert, cooperative, no distress, appears stated age. HEENT: Atraumatic, anicteric sclerae, pink conjunctivae     No oral ulcers, mucosa moist, throat clear, dentition fair  Neck:  Supple, symmetrical,  thyroid: non tender  Lungs:   Clear to auscultation bilaterally. No Wheezing or Rhonchi. No rales. Chest wall:  No tenderness  No Accessory muscle use. Heart:   Regular  rhythm,  No  murmur   No edema  Abdomen:   Soft, non-tender. Not distended. Bowel sounds normal  Extremities: No cyanosis. No clubbing,      Skin turgor normal, Capillary refill normal, Radial dial pulse 2+  Skin:     Not pale. Not Jaundiced  No rashes   Psych:  Good insight. Not depressed. Not anxious or agitated. Neurologic: EOMs intact. No facial asymmetry. No aphasia or slurred speech. Symmetrical strength, Sensation grossly intact.  Alert and oriented X 4.     _______________________________________________________________________  Care Plan discussed with:    Comments   Patient x    Family  x    RN x    Care Manager                    Consultant:  mookie _______________________________________________________________________  Expected  Disposition:   Home with Family x   HH/PT/OT/RN    SNF/LTC    OBDULIA    ________________________________________________________________________  TOTAL TIME:  60 Minutes    Critical Care Provided     Minutes non procedure based      Comments     Reviewed previous records   >50% of visit spent in counseling and coordination of care  Discussion with patient and/or family and questions answered       ________________________________________________________________________  Signed: Lieutenant Claudia MD    Procedures: see electronic medical records for all procedures/Xrays and details which were not copied into this note but were reviewed prior to creation of Plan. LAB DATA REVIEWED:    Recent Results (from the past 24 hour(s))   CBC WITH AUTOMATED DIFF    Collection Time: 08/19/21  7:05 PM   Result Value Ref Range    WBC 7.9 4.1 - 11.1 K/uL    RBC 5.55 4.10 - 5.70 M/uL    HGB 15.6 12.1 - 17.0 g/dL    HCT 47.8 36.6 - 50.3 %    MCV 86.1 80.0 - 99.0 FL    MCH 28.1 26.0 - 34.0 PG    MCHC 32.6 30.0 - 36.5 g/dL    RDW 13.8 11.5 - 14.5 %    PLATELET 832 883 - 330 K/uL    MPV 10.3 8.9 - 12.9 FL    NRBC 0.0 0  WBC    ABSOLUTE NRBC 0.00 0.00 - 0.01 K/uL    NEUTROPHILS 68 32 - 75 %    LYMPHOCYTES 18 12 - 49 %    MONOCYTES 7 5 - 13 %    EOSINOPHILS 5 0 - 7 %    BASOPHILS 1 0 - 1 %    IMMATURE GRANULOCYTES 1 (H) 0.0 - 0.5 %    ABS. NEUTROPHILS 5.4 1.8 - 8.0 K/UL    ABS. LYMPHOCYTES 1.4 0.8 - 3.5 K/UL    ABS. MONOCYTES 0.6 0.0 - 1.0 K/UL    ABS. EOSINOPHILS 0.4 0.0 - 0.4 K/UL    ABS. BASOPHILS 0.1 0.0 - 0.1 K/UL    ABS. IMM.  GRANS. 0.1 (H) 0.00 - 0.04 K/UL    DF AUTOMATED     METABOLIC PANEL, COMPREHENSIVE    Collection Time: 08/19/21  7:05 PM   Result Value Ref Range    Sodium 138 136 - 145 mmol/L    Potassium 3.6 3.5 - 5.1 mmol/L    Chloride 106 97 - 108 mmol/L    CO2 25 21 - 32 mmol/L    Anion gap 7 5 - 15 mmol/L    Glucose 147 (H) 65 - 100 mg/dL    BUN 12 6 - 20 MG/DL    Creatinine 1.05 0.70 - 1.30 MG/DL    BUN/Creatinine ratio 11 (L) 12 - 20      GFR est AA >60 >60 ml/min/1.73m2    GFR est non-AA >60 >60 ml/min/1.73m2    Calcium 9.1 8.5 - 10.1 MG/DL    Bilirubin, total 0.7 0.2 - 1.0 MG/DL    ALT (SGPT) 87 (H) 12 - 78 U/L    AST (SGOT) 65 (H) 15 - 37 U/L    Alk. phosphatase 74 45 - 117 U/L    Protein, total 7.5 6.4 - 8.2 g/dL    Albumin 3.8 3.5 - 5.0 g/dL    Globulin 3.7 2.0 - 4.0 g/dL    A-G Ratio 1.0 (L) 1.1 - 2.2     CK W/ REFLX CKMB    Collection Time: 08/19/21  7:05 PM   Result Value Ref Range     (H) 39 - 308 U/L   TROPONIN I    Collection Time: 08/19/21  7:05 PM   Result Value Ref Range    Troponin-I, Qt. 1.66 (H) <0.05 ng/mL   SAMPLES BEING HELD    Collection Time: 08/19/21  7:05 PM   Result Value Ref Range    SAMPLES BEING HELD [facility],RD     COMMENT        Add-on orders for these samples will be processed based on acceptable specimen integrity and analyte stability, which may vary by analyte. PROTHROMBIN TIME + INR    Collection Time: 08/19/21  7:05 PM   Result Value Ref Range    INR 1.0 0.9 - 1.1      Prothrombin time 10.6 9.0 - 11.1 sec   PTT    Collection Time: 08/19/21  7:05 PM   Result Value Ref Range    aPTT 25.9 22.1 - 31.0 sec    aPTT, therapeutic range     58.0 - 77.0 SECS   CK-MB,QUANT.     Collection Time: 08/19/21  7:05 PM   Result Value Ref Range    CK - MB 19.8 (H) <3.6 NG/ML    CK-MB Index 4.1 (H) 0.0 - 2.5     EKG, 12 LEAD, INITIAL    Collection Time: 08/19/21  7:05 PM   Result Value Ref Range    Ventricular Rate 89 BPM    Atrial Rate 89 BPM    P-R Interval 166 ms    QRS Duration 118 ms    Q-T Interval 380 ms    QTC Calculation (Bezet) 462 ms    Calculated P Axis 39 degrees    Calculated R Axis -36 degrees    Calculated T Axis 94 degrees    Diagnosis       Sinus rhythm with premature atrial complexes  Left axis deviation  Left ventricular hypertrophy with QRS widening and repolarization abnormality  Cannot rule out Septal infarct , age undetermined  No previous ECGs available     POC TROPONIN-I    Collection Time: 08/19/21  7:09 PM   Result Value Ref Range    Troponin-I (POC) 0.45 (H) 0.00 - 0.08 ng/mL   EKG, 12 LEAD, SUBSEQUENT    Collection Time: 08/19/21  8:34 PM   Result Value Ref Range    Ventricular Rate 81 BPM    Atrial Rate 81 BPM    P-R Interval 170 ms    QRS Duration 96 ms    Q-T Interval 370 ms    QTC Calculation (Bezet) 429 ms    Calculated P Axis 40 degrees    Calculated R Axis -28 degrees    Calculated T Axis 75 degrees    Diagnosis       Normal sinus rhythm with sinus arrhythmia  Normal ECG  When compared with ECG of 19-AUG-2021 19:05,  MANUAL COMPARISON REQUIRED, DATA IS UNCONFIRMED

## 2021-08-20 NOTE — PROGRESS NOTES
Pt. Arrived to [facility] from ED# 10. Pt. Reported left CP 5-6/10, numbness to left & right arms and left & right great toes. Pt. Denied SOB, palpitation and dizziness. Heparin gtt is infusing at 10 units/kg/hr. Nitro gtt is infusing at maximum dose of 20 mcg/min.

## 2021-08-21 LAB
CHOLEST SERPL-MCNC: 154 MG/DL
HDLC SERPL-MCNC: 36 MG/DL
HDLC SERPL: 4.3 {RATIO} (ref 0–5)
LDLC SERPL CALC-MCNC: 95.8 MG/DL (ref 0–100)
TRIGL SERPL-MCNC: 111 MG/DL (ref ?–150)
VLDLC SERPL CALC-MCNC: 22.2 MG/DL

## 2021-08-21 PROCEDURE — 80061 LIPID PANEL: CPT

## 2021-08-21 PROCEDURE — 65660000000 HC RM CCU STEPDOWN

## 2021-08-21 PROCEDURE — 74011250637 HC RX REV CODE- 250/637: Performed by: INTERNAL MEDICINE

## 2021-08-21 PROCEDURE — 74011250637 HC RX REV CODE- 250/637: Performed by: STUDENT IN AN ORGANIZED HEALTH CARE EDUCATION/TRAINING PROGRAM

## 2021-08-21 PROCEDURE — 74011250636 HC RX REV CODE- 250/636: Performed by: INTERNAL MEDICINE

## 2021-08-21 PROCEDURE — 36415 COLL VENOUS BLD VENIPUNCTURE: CPT

## 2021-08-21 RX ORDER — FUROSEMIDE 10 MG/ML
40 INJECTION INTRAMUSCULAR; INTRAVENOUS ONCE
Status: COMPLETED | OUTPATIENT
Start: 2021-08-21 | End: 2021-08-21

## 2021-08-21 RX ORDER — FUROSEMIDE 40 MG/1
40 TABLET ORAL DAILY
Status: DISCONTINUED | OUTPATIENT
Start: 2021-08-22 | End: 2021-08-22 | Stop reason: HOSPADM

## 2021-08-21 RX ORDER — CALCIUM CARBONATE 200(500)MG
200 TABLET,CHEWABLE ORAL AS NEEDED
Status: DISCONTINUED | OUTPATIENT
Start: 2021-08-21 | End: 2021-08-22 | Stop reason: HOSPADM

## 2021-08-21 RX ADMIN — TICAGRELOR 90 MG: 90 TABLET ORAL at 18:08

## 2021-08-21 RX ADMIN — LOSARTAN POTASSIUM 25 MG: 25 TABLET, FILM COATED ORAL at 08:36

## 2021-08-21 RX ADMIN — METOPROLOL TARTRATE 25 MG: 25 TABLET, FILM COATED ORAL at 17:29

## 2021-08-21 RX ADMIN — METOPROLOL TARTRATE 25 MG: 25 TABLET, FILM COATED ORAL at 08:36

## 2021-08-21 RX ADMIN — POLYETHYLENE GLYCOL 3350 17 G: 17 POWDER, FOR SOLUTION ORAL at 17:26

## 2021-08-21 RX ADMIN — PANTOPRAZOLE SODIUM 40 MG: 40 TABLET, DELAYED RELEASE ORAL at 08:36

## 2021-08-21 RX ADMIN — FUROSEMIDE 40 MG: 10 INJECTION, SOLUTION INTRAMUSCULAR; INTRAVENOUS at 17:28

## 2021-08-21 RX ADMIN — ROSUVASTATIN 20 MG: 20 TABLET, FILM COATED ORAL at 20:52

## 2021-08-21 RX ADMIN — Medication 10 ML: at 20:52

## 2021-08-21 RX ADMIN — CALCIUM CARBONATE (ANTACID) CHEW TAB 500 MG 200 MG: 500 CHEW TAB at 09:23

## 2021-08-21 RX ADMIN — TICAGRELOR 90 MG: 90 TABLET ORAL at 08:36

## 2021-08-21 RX ADMIN — ASPIRIN 81 MG: 81 TABLET, CHEWABLE ORAL at 08:36

## 2021-08-21 NOTE — PROGRESS NOTES
End of Shift Note    Bedside shift change report given to The Larisa (oncoming nurse) by Merced Muñoz (offgoing nurse). Report included the following information SBAR    Shift worked:  7a-7p     Shift summary and any significant changes:     uneventful     Concerns for physician to address:  none     Zone phone for oncoming shift:          Activity:  Activity Level: Up ad pamela  Number times ambulated in hallways past shift: 1  Number of times OOB to chair past shift: 0    Cardiac:   Cardiac Monitoring: Yes      Cardiac Rhythm: Sinus Rhythm    Access:   Current line(s): PIV     Genitourinary:   Urinary status: voiding    Respiratory:   O2 Device: None (Room air)  Chronic home O2 use?: NO  Incentive spirometer at bedside: NO     GI:  Last Bowel Movement Date: 08/21/21  Current diet:  ADULT DIET Regular; 3 carb choices (45 gm/meal); Low Fat/Low Chol/High Fiber/YAN; Low Sodium (2 gm)  Passing flatus: YES  Tolerating current diet: YES       Pain Management:   Patient states pain is manageable on current regimen: YES    Skin:  Sourav Score: 23  Interventions: increase time out of bed    Patient Safety:  Fall Score:  Total Score: 1  Interventions: assistive device (walker, cane, etc), gripper socks and pt to call before getting OOB       Length of Stay:  Expected LOS: 2d 2h  Actual LOS: [address]

## 2021-08-21 NOTE — PROGRESS NOTES
Cardiology Progress Note      2021    Admit Date: 2021      Subjective:  Reports dyspnea upon waking in the AM around the time of his pills, might be related to the Brilinta or lying back Dmitri De León, PAUL). +cough with that, too. He denies chest, jaw, neck, shoulder, or arm discomfort. No worsening edema. He denies TIA or stroke symptoms. Does have urinary frequency, not new, but the gross hematuria that started yesterday is. Visit Vitals  /78   Pulse 86   Temp 98.1 °F (36.7 °C)   Resp 16   Ht 5' 9\" (1.753 m)   Wt 100.3 kg (221 lb 1.9 oz)   SpO2 92%   BMI 32.65 kg/m²      190 -  0700  In: 560 [P.O.:560]  Out: 800 [Urine:800]        Objective:      Physical Exam:  Temp (24hrs), Av.1 °F (36.7 °C), Min:97.7 °F (36.5 °C), Max:98.3 °F (36.8 °C)    Patient Vitals for the past 8 hrs:   Pulse   21 1117 86   21 0749 84    Patient Vitals for the past 8 hrs:   Resp   21 1117 16   21 0749 14    Patient Vitals for the past 8 hrs:   BP   21 1117 112/78   21 0749 115/86          Intake/Output Summary (Last 24 hours) at 2021 1516  Last data filed at 2021 1117  Gross per 24 hour   Intake 500 ml   Output 300 ml   Net 200 ml     Nondiaphoretic, not in acute distress. MMM, no jaundice, HEENT stable. Unlabored, clear to auscultation bilaterally, symmetric air movement. Regular rate and rhythm, no murmur, pericardial rub, knock, or gallop. No JVD or peripheral edema. Palpable radial pulses bilaterally. Abdomen soft, nontender, nondistended. No pulsatile masses or bruit. No cyanosis. Skin warm and dry. No ulcers or rash. Musculoskeletal exam stable. Awake, appropriate, neuro grossly nonfocal.  No tremor. Telemetry: SR R 60's       Assessment:     1. NSTEMI, s/p PCI of 90% occluded proximal LAD with ulcerated plaque on .  2.  Ischemic cardiomyopathy EF estimated at 35-40%, severe anteroapical hypokinesis with good basilar function.   3. Acute systolic CHF also consistent with elevated LVEDP during cath. 4.  Hypertension. 5.  Gastroesophageal reflux. 6.  History of nephrolithiasis. 7.  History of tobacco abuse with none for 2 years. 8.  Gross hematuria, stable. Nocturia and frequency PTA, probably has BPH. 9.  Full code. Plan:      I spent at least 45 minutes alone answering multiple questions about his echo result, activity, work, etc, as well as addressing his needs. Complex decision making was performed. We discussed the shortness of breath which I believe is orthopnea +/- Brilinta side effect. He's already tried coffee with the Brilinta, hasn't helped. Will give IV furosemide now and see if this helps, as he still wants to stay with the Brilinta if it is not the main cause of his SOB. If it is unbearable, then will load (300-600 mg) with clopidogrel 12 hours after the last Brilinta and then use a maintenance of 75 mg daily. Will see how tonight goes. Also, will be able to watch his gross hematuria a little further. We reviewed his perspective medications including metoprolol, losartan, furosemide, DAPT, PPI, etc.  Hope he has some recovery of LV EF with time.     I recommend that he stay overnight and if OK, home tomorrow AM.     Signed By: Dianna Chilel MD     August 21, 2021

## 2021-08-21 NOTE — PROGRESS NOTES
Problem: Falls - Risk of  Goal: *Absence of Falls  Description: Document Dennise Gage Fall Risk and appropriate interventions in the flowsheet.   Outcome: Progressing Towards Goal  Note: Fall Risk Interventions:            Medication Interventions: Patient to call before getting OOB, Teach patient to arise slowly

## 2021-08-21 NOTE — PROGRESS NOTES
Problem: Falls - Risk of  Goal: *Absence of Falls  Description: Document Chelsea Jones Fall Risk and appropriate interventions in the flowsheet.   Outcome: Progressing Towards Goal  Note: Fall Risk Interventions:            Medication Interventions: Teach patient to arise slowly

## 2021-08-21 NOTE — PROGRESS NOTES
Problem: Falls - Risk of  Goal: *Absence of Falls  Description: Document Mariam Donroyal Fall Risk and appropriate interventions in the flowsheet. Outcome: Progressing Towards Goal  Note: Fall Risk Interventions:            Medication Interventions: Patient to call before getting OOB, Teach patient to arise slowly                   Problem: Pain  Goal: *Control of Pain  Outcome: Progressing Towards Goal  Goal: *PALLIATIVE CARE:  Alleviation of Pain  Outcome: Progressing Towards Goal     Problem: Patient Education: Go to Patient Education Activity  Goal: Patient/Family Education  Outcome: Progressing Towards Goal     Problem: Pressure Injury - Risk of  Goal: *Prevention of pressure injury  Description: Document Sourav Scale and appropriate interventions in the flowsheet.   Outcome: Progressing Towards Goal  Note: Pressure Injury Interventions:  Sensory Interventions: Assess changes in LOC, Keep linens dry and wrinkle-free, Minimize linen layers         Activity Interventions: Increase time out of bed                               Problem: Patient Education: Go to Patient Education Activity  Goal: Patient/Family Education  Outcome: Progressing Towards Goal     Problem: Patient Education: Go to Patient Education Activity  Goal: Patient/Family Education  Outcome: Progressing Towards Goal     Problem: Unstable angina/NSTEMI: Day of Admission/Day 1  Goal: Off Pathway (Use only if patient is Off Pathway)  Outcome: Progressing Towards Goal  Goal: Activity/Safety  Outcome: Progressing Towards Goal  Goal: Diagnostic Test/Procedures  Outcome: Progressing Towards Goal  Goal: Nutrition/Diet  Outcome: Progressing Towards Goal

## 2021-08-21 NOTE — PROGRESS NOTES
0715: Bedside shift report received from Gerri Pearl Dr: Admission Database Belongings completed. Pt stated that wife took his wallet home upon his admission on 08/19/21    1920:  End of Shift Note    Bedside shift change report given to SCARLET Pearl by Beryle Gallo, RN & Stacie Self RN. Report included the following information SBAR, Intake/Output, MAR, Recent Results and Med Rec Status    Shift worked:  4419-2141     Shift summary and any significant changes:    Pt stated on Lasix to resolve orthopnea   Concerns for physician to address: None   Zone phone for oncoming shift:       Activity:  Activity Level: Up ad pamela  Number times ambulated in hallways past shift: 0  Number of times OOB to chair past shift: 5    Cardiac:   Cardiac Monitoring: Yes      Cardiac Rhythm: Sinus Rhythm    Access:   Current line(s): PIV     Genitourinary:   Urinary status: voiding    Respiratory:   O2 Device: None (Room air)  Chronic home O2 use?: NO  Incentive spirometer at bedside: NO     GI:  Last Bowel Movement Date: 08/21/21  Current diet:  ADULT DIET Regular; 3 carb choices (45 gm/meal); Low Fat/Low Chol/High Fiber/YAN; Low Sodium (2 gm)  Passing flatus: YES  Tolerating current diet: YES       Pain Management:   Patient states pain is manageable on current regimen: YES    Skin:  Sourav Score: 23  Interventions: increase time out of bed and nutritional support     Patient Safety:  Fall Score:  Total Score: 1  Interventions: gripper socks and pt to call before getting OOB       Length of Stay:  Expected LOS: 2d 2h  Actual LOS: 2      Beryle Gallo, RN

## 2021-08-21 NOTE — DISCHARGE INSTRUCTIONS
HOSPITALIST DISCHARGE INSTRUCTIONS    NAME: Sundar Mark   :  1960   MRN:  [id number]     Date/Time:  2021 1:47 PM    ADMIT DATE: 2021     DISCHARGE DATE: 2021     DISCHARGE DIAGNOSIS:  Active Problems:    NSTEMI (non-ST elevated myocardial infarction) ([facility] [address] (2021)     s/p PCI and LYRIC to LAD    Follow-up Information     Follow up With Specialties Details Why Contact Info    Liam Tovar MD Family Medicine On 2021 at 11:00 am with Eliza Hicks NP. [address]  [address] [phone], Prince Devine MD Cardiology   [address]  [id number]  [address] [phone]            MEDICATIONS:  As per medication reconciliation  list  · It is important that you take the medication exactly as they are prescribed. · Keep your medication in the bottles provided by the pharmacist and keep a list of the medication names, dosages, and times to be taken in your wallet. · Do not take other medications without consulting your doctor. Pain Management: per above medications    What to do at Home    Recommended diet:  Cardiac Diet    Recommended activity: Activity as tolerated    If you have questions regarding the hospital related prescriptions or hospital related issues please call [facility] joe Anguiano at [phone]. If you experience any of the following symptoms then please call your primary care physician or return to the emergency room if you cannot get hold of your doctor:  Fever, chills, nausea, vomiting, diarrhea, change in mentation, falling, bleeding, shortness of breath    Follow Up:  Dr. Liam Tovar MD  you are to call and set up an appointment to see them in 7-10 days. Information obtained by :  I understand that if any problems occur once I am at home I am to contact my physician. I understand and acknowledge receipt of the instructions indicated above. Physician's or R.N.'s Signature                                                                  Date/Time                                                                                                                                              Patient or Representative Signature                                                          Date/Time

## 2021-08-22 VITALS
DIASTOLIC BLOOD PRESSURE: 76 MMHG | TEMPERATURE: 98.1 F | HEART RATE: 65 BPM | WEIGHT: 221.12 LBS | OXYGEN SATURATION: 95 % | RESPIRATION RATE: 18 BRPM | BODY MASS INDEX: 32.75 KG/M2 | SYSTOLIC BLOOD PRESSURE: 117 MMHG | HEIGHT: 69 IN

## 2021-08-22 LAB
ANION GAP SERPL CALC-SCNC: 8 MMOL/L (ref 5–15)
BUN SERPL-MCNC: 16 MG/DL (ref 6–20)
BUN/CREAT SERPL: 16 (ref 12–20)
CALCIUM SERPL-MCNC: 8.7 MG/DL (ref 8.5–10.1)
CHLORIDE SERPL-SCNC: 104 MMOL/L (ref 97–108)
CO2 SERPL-SCNC: 24 MMOL/L (ref 21–32)
CREAT SERPL-MCNC: 0.97 MG/DL (ref 0.7–1.3)
ECHO AV AREA PEAK VELOCITY: 3.79 CM2
ECHO AV AREA/BSA PEAK VELOCITY: 1.8 CM2/M2
ECHO AV CUSP MM: 1.76 CM
ECHO AV PEAK GRADIENT: 3.5 MMHG
ECHO AV PEAK VELOCITY: 93.5 CM/S
ECHO EST RA PRESSURE: 3 MMHG
ECHO LA AREA 4C: 16.6 CM2
ECHO LA MAJOR AXIS: 3.45 CM
ECHO LA MINOR AXIS: 1.6 CM
ECHO LA TO AORTIC ROOT RATIO: 0.9
ECHO LA TO AORTIC ROOT RATIO: 0.9
ECHO LA VOL 2C: 56.98 ML (ref 18–58)
ECHO LA VOL 4C: 41.93 ML (ref 18–58)
ECHO LA VOL BP: 53.43 ML (ref 18–58)
ECHO LA VOL/BSA BIPLANE: 24.74 ML/M2 (ref 16–28)
ECHO LA VOLUME INDEX A2C: 26.38 ML/M2 (ref 16–28)
ECHO LA VOLUME INDEX A4C: 19.41 ML/M2 (ref 16–28)
ECHO LV E' LATERAL VELOCITY: 4.18 CM/S
ECHO LV E' SEPTAL VELOCITY: 8.01 CM/S
ECHO LV INTERNAL DIMENSION DIASTOLIC: 4.54 CM (ref 4.2–5.9)
ECHO LV INTERNAL DIMENSION SYSTOLIC: 3.5 CM
ECHO LV IVSD: 2.02 CM (ref 0.6–1)
ECHO LV IVSS: 2.04 CM
ECHO LV MASS 2D: 408.9 G (ref 88–224)
ECHO LV MASS INDEX 2D: 189.3 G/M2 (ref 49–115)
ECHO LV POSTERIOR WALL DIASTOLIC: 1.8 CM (ref 0.6–1)
ECHO LV POSTERIOR WALL SYSTOLIC: 1.9 CM
ECHO LVOT DIAM: 2.14 CM
ECHO LVOT PEAK GRADIENT: 3.91 MMHG
ECHO LVOT PEAK VELOCITY: 98.91 CM/S
ECHO MV A VELOCITY: 46.18 CM/S
ECHO MV E DECELERATION TIME (DT): 114.97 MS
ECHO MV E VELOCITY: 64.48 CM/S
ECHO MV E/A RATIO: 1.4
ECHO MV E/E' LATERAL: 15.43
ECHO MV E/E' RATIO (AVERAGED): 11.74
ECHO MV E/E' SEPTAL: 8.05
ECHO PV MAX VELOCITY: 72.6 CM/S
ECHO PV PEAK INSTANTANEOUS GRADIENT SYSTOLIC: 2.11 MMHG
ECHO PV REGURGITANT MAX VELOCITY: 130.85 CM/S
ECHO RV INTERNAL DIMENSION: 2.85 CM
ECHO TV REGURGITANT MAX VELOCITY: 344.64 CM/S
ERYTHROCYTE [DISTWIDTH] IN BLOOD BY AUTOMATED COUNT: 14.6 % (ref 11.5–14.5)
GLUCOSE SERPL-MCNC: 109 MG/DL (ref 65–100)
HCT VFR BLD AUTO: 46 % (ref 36.6–50.3)
HGB BLD-MCNC: 15.2 G/DL (ref 12.1–17)
MCH RBC QN AUTO: 28.6 PG (ref 26–34)
MCHC RBC AUTO-ENTMCNC: 33 G/DL (ref 30–36.5)
MCV RBC AUTO: 86.5 FL (ref 80–99)
NRBC # BLD: 0 K/UL (ref 0–0.01)
NRBC BLD-RTO: 0 PER 100 WBC
PLATELET # BLD AUTO: 231 K/UL (ref 150–400)
PMV BLD AUTO: 10 FL (ref 8.9–12.9)
POTASSIUM SERPL-SCNC: 3.9 MMOL/L (ref 3.5–5.1)
RBC # BLD AUTO: 5.32 M/UL (ref 4.1–5.7)
SODIUM SERPL-SCNC: 136 MMOL/L (ref 136–145)
WBC # BLD AUTO: 11.2 K/UL (ref 4.1–11.1)

## 2021-08-22 PROCEDURE — 36415 COLL VENOUS BLD VENIPUNCTURE: CPT

## 2021-08-22 PROCEDURE — 74011250637 HC RX REV CODE- 250/637: Performed by: STUDENT IN AN ORGANIZED HEALTH CARE EDUCATION/TRAINING PROGRAM

## 2021-08-22 PROCEDURE — 74011250637 HC RX REV CODE- 250/637: Performed by: INTERNAL MEDICINE

## 2021-08-22 PROCEDURE — 85027 COMPLETE CBC AUTOMATED: CPT

## 2021-08-22 PROCEDURE — 80048 BASIC METABOLIC PNL TOTAL CA: CPT

## 2021-08-22 RX ORDER — FUROSEMIDE 40 MG/1
TABLET ORAL
Qty: 60 TABLET | Refills: 0 | Status: SHIPPED | OUTPATIENT
Start: 2021-08-23

## 2021-08-22 RX ORDER — METOPROLOL TARTRATE 25 MG/1
25 TABLET, FILM COATED ORAL 2 TIMES DAILY
Qty: 60 TABLET | Refills: 1 | Status: SHIPPED | OUTPATIENT
Start: 2021-08-22

## 2021-08-22 RX ORDER — ROSUVASTATIN CALCIUM 20 MG/1
20 TABLET, COATED ORAL
Qty: 30 TABLET | Refills: 1 | Status: SHIPPED | OUTPATIENT
Start: 2021-08-22

## 2021-08-22 RX ORDER — GUAIFENESIN 100 MG/5ML
81 LIQUID (ML) ORAL DAILY
Qty: 60 TABLET | Refills: 0 | Status: SHIPPED | OUTPATIENT
Start: 2021-08-23

## 2021-08-22 RX ADMIN — Medication 10 ML: at 06:00

## 2021-08-22 RX ADMIN — ASPIRIN 81 MG: 81 TABLET, CHEWABLE ORAL at 08:10

## 2021-08-22 RX ADMIN — TICAGRELOR 90 MG: 90 TABLET ORAL at 08:10

## 2021-08-22 RX ADMIN — FUROSEMIDE 40 MG: 40 TABLET ORAL at 08:10

## 2021-08-22 RX ADMIN — METOPROLOL TARTRATE 25 MG: 25 TABLET, FILM COATED ORAL at 08:10

## 2021-08-22 RX ADMIN — PANTOPRAZOLE SODIUM 40 MG: 40 TABLET, DELAYED RELEASE ORAL at 08:10

## 2021-08-22 RX ADMIN — LOSARTAN POTASSIUM 25 MG: 25 TABLET, FILM COATED ORAL at 08:10

## 2021-08-22 NOTE — PROGRESS NOTES
Hospitalist Progress Note    NAME: Markus Moya   :  1960   MRN:  [id number]       Assessment / Plan:  Chest pain  NSTEMI  Elevated troponin  S/p Cath , LYRIC to LAD  C/w aspirin, brilinta, cozaar, statin  Complains of SOB which he attributes to brilinta, cardiology giving a dose of lasix and see if he can tolerate today  Plan to discharge in AM     Hypertension  Resume home meds    Hematuria:  Started yesterday  Better today  Voiding, will monitor     GERD  -Continue with the home medications     Code Status: Full code  Surrogate Decision Maker: Wife     DVT Prophylaxis: Heparin  GI Prophylaxis: not indicated     Baseline: Ambulatory at home  Anticipated discharge:      Subjective:     Chief Complaint / Reason for Physician Visit  Follow up for Chest pain  HE complains of some shortness of breath    Review of Systems:  Symptom Y/N Comments  Symptom Y/N Comments   Fever/Chills n   Chest Pain y    Poor Appetite n   Edema     Cough    Abdominal Pain     Sputum    Joint Pain     SOB/TREJO    Pruritis/Rash     Nausea/vomit    Tolerating PT/OT     Diarrhea    Tolerating Diet     Constipation    Other       Could NOT obtain due to:      Objective:     VITALS:   Last 24hrs VS reviewed since prior progress note. Most recent are:  Patient Vitals for the past 24 hrs:   Temp Pulse Resp BP SpO2   21 1925 98.1 °F (36.7 °C) 74 18 126/72 95 %   21 1728 -- 82 -- 111/83 --   21 1547 98.5 °F (36.9 °C) 79 18 115/75 94 %   21 1117 98.1 °F (36.7 °C) 86 16 112/78 92 %   21 0749 98.3 °F (36.8 °C) 84 14 115/86 91 %   21 0415 97.7 °F (36.5 °C) 91 18 116/81 93 %       Intake/Output Summary (Last 24 hours) at 2021  Last data filed at 2021 1912  Gross per 24 hour   Intake 1840 ml   Output 1600 ml   Net 240 ml        I had a face to face encounter and independently examined this patient on 2021, as outlined below:  PHYSICAL EXAM:  General: WD, WN.  Alert, cooperative, no acute distress    EENT:  EOMI. Anicteric sclerae. MMM  Resp:  CTA bilaterally, no wheezing or rales. No accessory muscle use  CV:  Regular  rhythm,  No edema  GI:  Soft, Non distended, Non tender. +Bowel sounds  Neurologic:  Alert and oriented X 3, normal speech,   Psych:   Good insight. Not anxious nor agitated  Skin:  No rashes. No jaundice    Reviewed most current lab test results and cultures  YES  Reviewed most current radiology test results   YES  Review and summation of old records today    NO  Reviewed patient's current orders and MAR    YES  PMH/SH reviewed - no change compared to H&P  ________________________________________________________________________  Care Plan discussed with:    Comments   Patient y    Family  y    RN y    Care Manager     Consultant                        Multidiciplinary team rounds were held today with , nursing, pharmacist and clinical coordinator. Patient's plan of care was discussed; medications were reviewed and discharge planning was addressed. ________________________________________________________________________  Total NON critical care TIME:  35   Minutes    Total CRITICAL CARE TIME Spent:   Minutes non procedure based      Comments   >50% of visit spent in counseling and coordination of care     ________________________________________________________________________  Thang Alcantar MD     Procedures: see electronic medical records for all procedures/Xrays and details which were not copied into this note but were reviewed prior to creation of Plan. LABS:  I reviewed today's most current labs and imaging studies.   Pertinent labs include:  Recent Labs     08/20/21  0327 08/19/21  1905   WBC 10.4 7.9   HGB 14.7 15.6   HCT 43.8 47.8    208     Recent Labs     08/20/21  0327 08/19/21  1905    138   K 3.8 3.6    106   CO2 24 25   * 147*   BUN 15 12   CREA 0.95 1.05   CA 8.7 9.1   MG 2.0  --    ALB  --  3.8   TBILI  --  0.7 ALT  --  87*   INR  --  1.0       Signed: Joyce Beasley MD

## 2021-08-22 NOTE — DISCHARGE SUMMARY
Hospitalist Discharge Summary     Patient ID:  Rajni Nicolas  [id number]  64 y.o.  1960 8/19/2021    PCP on record: Ted Dutta MD    Admit date: 8/19/2021  Discharge date and time: 8/22/2021    DISCHARGE DIAGNOSIS:  NSTEMI s/p PCI and LYRIC to LAD    CONSULTATIONS:  None    Excerpted HPI from H&P of Claudia Danielson MD:  Rajni Nicolas is a 57 y.o.  male who presents with chest pain that started today in the evening around 5:00. Patient past medical history of hypertension. Patient stated that today in the evening he started having chest pain located left side of her chest radiating to the left arm 10/10 in severity, constant in nature, no association with nausea and vomiting. Denies any diaphoresis, no shortness of breath, no fever or chills. No similar complaints in the past.  Patient still having chest pain.    ______________________________________________________________________  DISCHARGE SUMMARY/HOSPITAL COURSE:  for full details see H&P, daily progress notes, labs, consult notes. Chest pain  NSTEMI  Elevated troponin  S/p Cath , LYRIC to LAD  C/w aspirin, brilinta, cozaar, statin  Mild SOB, started on lasix by cardiology will prescribe     Hypertension  Resume home meds     Hematuria:  Started yesterday  Better today  Voiding  Hb stable     GERD  -Continue with the home medications    D/c home today      _______________________________________________________________________  Patient seen and examined by me on discharge day. Pertinent Findings:  Gen:    Not in distress  Chest: Clear lungs  CVS:   Regular rhythm. No edema  Abd:  Soft, not distended, not tender  Neuro:  Alert,   _______________________________________________________________________  DISCHARGE MEDICATIONS:   Current Discharge Medication List      START taking these medications    Details   aspirin 81 mg chewable tablet Take 1 Tablet by mouth daily.   Qty: 60 Tablet, Refills: 0  Start date: 8/23/2021      furosemide (LASIX) 40 mg tablet Take daily  Qty: 60 Tablet, Refills: 0  Start date: 8/23/2021      metoprolol tartrate (LOPRESSOR) 25 mg tablet Take 1 Tablet by mouth two (2) times a day. Qty: 60 Tablet, Refills: 1  Start date: 8/22/2021      rosuvastatin (CRESTOR) 20 mg tablet Take 1 Tablet by mouth nightly. Qty: 30 Tablet, Refills: 1  Start date: 8/22/2021      ticagrelor (BRILINTA) 90 mg tablet Take 1 Tablet by mouth every twelve (12) hours every twelve (12) hours. Qty: 60 Tablet, Refills: 2  Start date: 8/22/2021         CONTINUE these medications which have NOT CHANGED    Details   omeprazole (PRILOSEC) 20 mg capsule Take 20 mg by mouth daily. ascorbic acid, vitamin C, (Vitamin C) 500 mg tablet Take 1,000 mg by mouth daily. olmesartan (BENICAR) 20 mg tablet Take 20 mg by mouth daily. STOP taking these medications       butalbital-acetaminophen-caff (FIORICET) -40 mg per capsule Comments:   Reason for Stopping:         ibuprofen (MOTRIN) 200 mg tablet Comments:   Reason for Stopping:                 Patient Follow Up Instructions:    Activity: Activity as tolerated  Diet: Cardiac Diet  Wound Care: None needed    Follow-up with your PMD/Cardiologist    Follow-up Information     Follow up With Specialties Details Why Contact Info    Ruby Vail MD Family Medicine On 8/24/2021 8/24/21 at 11:00 am with Laurent Rush NP. [address]  [address] [phone]      Landon Savage MD Cardiology   [address]  [id number]  [address] [phone]          ________________________________________________________________    Risk of deterioration: Low    Condition at Discharge:  Stable  __________________________________________________________________    Disposition  Home with family, no needs    ____________________________________________________________________    Code Status: Full Code  ___________________________________________________________________      Total time in minutes spent coordinating this discharge (includes going over instructions, follow-up, prescriptions, and preparing report for sign off to her PCP) :  >30 minutes    Signed:  Zaria Miller MD

## 2021-09-02 ENCOUNTER — APPOINTMENT (OUTPATIENT)
Dept: CT IMAGING | Age: 61
End: 2021-09-02
Attending: EMERGENCY MEDICINE
Payer: COMMERCIAL

## 2021-09-02 ENCOUNTER — HOSPITAL ENCOUNTER (EMERGENCY)
Age: 61
Discharge: HOME | End: 2021-09-02
Attending: EMERGENCY MEDICINE | Admitting: EMERGENCY MEDICINE
Payer: COMMERCIAL

## 2021-09-02 VITALS
DIASTOLIC BLOOD PRESSURE: 80 MMHG | HEART RATE: 81 BPM | TEMPERATURE: 98.1 F | SYSTOLIC BLOOD PRESSURE: 120 MMHG | BODY MASS INDEX: 31.09 KG/M2 | OXYGEN SATURATION: 96 % | HEIGHT: 69 IN | RESPIRATION RATE: 9 BRPM | WEIGHT: 209.88 LBS

## 2021-09-02 DIAGNOSIS — R31.9 HEMATURIA, UNSPECIFIED TYPE: ICD-10-CM

## 2021-09-02 DIAGNOSIS — K57.90 DIVERTICULOSIS: ICD-10-CM

## 2021-09-02 DIAGNOSIS — R07.89 ATYPICAL CHEST PAIN: ICD-10-CM

## 2021-09-02 DIAGNOSIS — N20.0 KIDNEY STONE: Primary | ICD-10-CM

## 2021-09-02 LAB
ABO + RH BLD: NORMAL
ALBUMIN SERPL-MCNC: 3.6 G/DL (ref 3.5–5)
ALBUMIN/GLOB SERPL: 0.8 {RATIO} (ref 1.1–2.2)
ALP SERPL-CCNC: 86 U/L (ref 45–117)
ALT SERPL-CCNC: 68 U/L (ref 12–78)
ANION GAP SERPL CALC-SCNC: 8 MMOL/L (ref 5–15)
APPEARANCE UR: ABNORMAL
AST SERPL-CCNC: 39 U/L (ref 15–37)
ATRIAL RATE: 77 BPM
BACTERIA URNS QL MICRO: NEGATIVE /HPF
BASOPHILS # BLD: 0.1 K/UL (ref 0–0.1)
BASOPHILS NFR BLD: 1 % (ref 0–1)
BILIRUB SERPL-MCNC: 0.4 MG/DL (ref 0.2–1)
BILIRUB UR QL: NEGATIVE
BLOOD GROUP ANTIBODIES SERPL: NORMAL
BUN SERPL-MCNC: 14 MG/DL (ref 6–20)
BUN/CREAT SERPL: 13 (ref 12–20)
CALCIUM SERPL-MCNC: 9.5 MG/DL (ref 8.5–10.1)
CALCULATED P AXIS, ECG09: 57 DEGREES
CALCULATED R AXIS, ECG10: -2 DEGREES
CALCULATED T AXIS, ECG11: 101 DEGREES
CHLORIDE SERPL-SCNC: 102 MMOL/L (ref 97–108)
CO2 SERPL-SCNC: 29 MMOL/L (ref 21–32)
COLOR UR: ABNORMAL
CREAT SERPL-MCNC: 1.12 MG/DL (ref 0.7–1.3)
DIAGNOSIS, 93000: NORMAL
DIFFERENTIAL METHOD BLD: ABNORMAL
EOSINOPHIL # BLD: 0.3 K/UL (ref 0–0.4)
EOSINOPHIL NFR BLD: 2 % (ref 0–7)
EPITH CASTS URNS QL MICRO: ABNORMAL /LPF
ERYTHROCYTE [DISTWIDTH] IN BLOOD BY AUTOMATED COUNT: 13.5 % (ref 11.5–14.5)
GLOBULIN SER CALC-MCNC: 4.8 G/DL (ref 2–4)
GLUCOSE SERPL-MCNC: 92 MG/DL (ref 65–100)
GLUCOSE UR STRIP.AUTO-MCNC: NEGATIVE MG/DL
HCT VFR BLD AUTO: 50 % (ref 36.6–50.3)
HGB BLD-MCNC: 16.3 G/DL (ref 12.1–17)
HGB UR QL STRIP: ABNORMAL
IMM GRANULOCYTES # BLD AUTO: 0.1 K/UL (ref 0–0.04)
IMM GRANULOCYTES NFR BLD AUTO: 1 % (ref 0–0.5)
KETONES UR QL STRIP.AUTO: NEGATIVE MG/DL
LEUKOCYTE ESTERASE UR QL STRIP.AUTO: ABNORMAL
LIPASE SERPL-CCNC: 95 U/L (ref 73–393)
LYMPHOCYTES # BLD: 1.8 K/UL (ref 0.8–3.5)
LYMPHOCYTES NFR BLD: 15 % (ref 12–49)
MCH RBC QN AUTO: 28.1 PG (ref 26–34)
MCHC RBC AUTO-ENTMCNC: 32.6 G/DL (ref 30–36.5)
MCV RBC AUTO: 86.2 FL (ref 80–99)
MONOCYTES # BLD: 0.8 K/UL (ref 0–1)
MONOCYTES NFR BLD: 6 % (ref 5–13)
NEUTS SEG # BLD: 8.9 K/UL (ref 1.8–8)
NEUTS SEG NFR BLD: 75 % (ref 32–75)
NITRITE UR QL STRIP.AUTO: NEGATIVE
NRBC # BLD: 0 K/UL (ref 0–0.01)
NRBC BLD-RTO: 0 PER 100 WBC
P-R INTERVAL, ECG05: 178 MS
PH UR STRIP: 7 [PH] (ref 5–8)
PLATELET # BLD AUTO: 396 K/UL (ref 150–400)
PMV BLD AUTO: 9.5 FL (ref 8.9–12.9)
POTASSIUM SERPL-SCNC: 4.1 MMOL/L (ref 3.5–5.1)
PROT SERPL-MCNC: 8.4 G/DL (ref 6.4–8.2)
PROT UR STRIP-MCNC: NEGATIVE MG/DL
Q-T INTERVAL, ECG07: 414 MS
QRS DURATION, ECG06: 90 MS
QTC CALCULATION (BEZET), ECG08: 468 MS
RBC # BLD AUTO: 5.8 M/UL (ref 4.1–5.7)
RBC #/AREA URNS HPF: >100 /HPF (ref 0–5)
SODIUM SERPL-SCNC: 139 MMOL/L (ref 136–145)
SP GR UR REFRACTOMETRY: <1.005 (ref 1–1.03)
SPECIMEN EXP DATE BLD: NORMAL
TROPONIN I SERPL-MCNC: 0.05 NG/ML
TROPONIN I SERPL-MCNC: 0.07 NG/ML
UA: UC IF INDICATED,UAUC: ABNORMAL
UROBILINOGEN UR QL STRIP.AUTO: 1 EU/DL (ref 0.2–1)
VENTRICULAR RATE, ECG03: 77 BPM
WBC # BLD AUTO: 11.9 K/UL (ref 4.1–11.1)
WBC URNS QL MICRO: ABNORMAL /HPF (ref 0–4)

## 2021-09-02 PROCEDURE — 84484 ASSAY OF TROPONIN QUANT: CPT

## 2021-09-02 PROCEDURE — 83690 ASSAY OF LIPASE: CPT

## 2021-09-02 PROCEDURE — 96374 THER/PROPH/DIAG INJ IV PUSH: CPT

## 2021-09-02 PROCEDURE — 96375 TX/PRO/DX INJ NEW DRUG ADDON: CPT

## 2021-09-02 PROCEDURE — 36415 COLL VENOUS BLD VENIPUNCTURE: CPT

## 2021-09-02 PROCEDURE — 93005 ELECTROCARDIOGRAM TRACING: CPT

## 2021-09-02 PROCEDURE — 74011000636 HC RX REV CODE- 636: Performed by: EMERGENCY MEDICINE

## 2021-09-02 PROCEDURE — 74011250636 HC RX REV CODE- 250/636: Performed by: EMERGENCY MEDICINE

## 2021-09-02 PROCEDURE — 86901 BLOOD TYPING SEROLOGIC RH(D): CPT

## 2021-09-02 PROCEDURE — 74011250637 HC RX REV CODE- 250/637: Performed by: EMERGENCY MEDICINE

## 2021-09-02 PROCEDURE — 80053 COMPREHEN METABOLIC PANEL: CPT

## 2021-09-02 PROCEDURE — 96376 TX/PRO/DX INJ SAME DRUG ADON: CPT

## 2021-09-02 PROCEDURE — 74177 CT ABD & PELVIS W/CONTRAST: CPT

## 2021-09-02 PROCEDURE — 81001 URINALYSIS AUTO W/SCOPE: CPT

## 2021-09-02 PROCEDURE — 85025 COMPLETE CBC W/AUTO DIFF WBC: CPT

## 2021-09-02 PROCEDURE — 99285 EMERGENCY DEPT VISIT HI MDM: CPT

## 2021-09-02 RX ORDER — HYDROMORPHONE HYDROCHLORIDE 1 MG/ML
0.5 INJECTION, SOLUTION INTRAMUSCULAR; INTRAVENOUS; SUBCUTANEOUS
Status: COMPLETED | OUTPATIENT
Start: 2021-09-02 | End: 2021-09-02

## 2021-09-02 RX ORDER — FENTANYL CITRATE 50 UG/ML
25 INJECTION, SOLUTION INTRAMUSCULAR; INTRAVENOUS
Status: COMPLETED | OUTPATIENT
Start: 2021-09-02 | End: 2021-09-02

## 2021-09-02 RX ORDER — ONDANSETRON 2 MG/ML
4 INJECTION INTRAMUSCULAR; INTRAVENOUS
Status: COMPLETED | OUTPATIENT
Start: 2021-09-02 | End: 2021-09-02

## 2021-09-02 RX ADMIN — FENTANYL CITRATE 25 MCG: 50 INJECTION, SOLUTION INTRAMUSCULAR; INTRAVENOUS at 17:20

## 2021-09-02 RX ADMIN — SODIUM CHLORIDE 250 ML: 9 INJECTION, SOLUTION INTRAVENOUS at 19:14

## 2021-09-02 RX ADMIN — TICAGRELOR 90 MG: 90 TABLET ORAL at 21:08

## 2021-09-02 RX ADMIN — HYDROMORPHONE HYDROCHLORIDE 0.5 MG: 1 INJECTION, SOLUTION INTRAMUSCULAR; INTRAVENOUS; SUBCUTANEOUS at 19:14

## 2021-09-02 RX ADMIN — ONDANSETRON 4 MG: 2 INJECTION INTRAMUSCULAR; INTRAVENOUS at 17:19

## 2021-09-02 RX ADMIN — ONDANSETRON 4 MG: 2 INJECTION INTRAMUSCULAR; INTRAVENOUS at 19:14

## 2021-09-02 RX ADMIN — IOPAMIDOL 100 ML: 755 INJECTION, SOLUTION INTRAVENOUS at 18:44

## 2021-09-02 NOTE — ED NOTES
Report received from [address]. They advised of the patient's chief complaint, current status, orders completed (to include IV access/medications/radiology testing), outstanding orders that still need to be completed, and the treatment plan. Questions asked and answered prior to assumption of care.

## 2021-09-02 NOTE — ED PROVIDER NOTES
EMERGENCY DEPARTMENT HISTORY AND PHYSICAL EXAM      Date: 9/2/2021  Patient Name: Joselito Gong    History of Presenting Illness     Chief Complaint   Patient presents with    Blood in Urine     pt had heart attack one week ago, recently started on blood thinners ([place]), started bleeding heavily with urination    Abdominal Pain     radiates to back, pain started with blood in urine       History Provided By: Patient    HPI: Joselito Gong, 57 y.o. male presents to the ED with cc of hematuria and abdominal pain. Patient symptoms started approximately 2:15 PM.  He saw bright red blood in his urine at that time. He then began to experience bilateral lower quadrant pain and left lower back/flank pain. Pain is 5 out of 10 in severity at its worse. The abdominal pain has subsided, but the back pain is still a 4 out of 10. He also developed mild chest pain, which she describes as a burning sensation, as he arrived in the ER. States it is a 1 or 2 out of 10 in severity and does not feel like his myocardial infarction that he had on August 18. He denies shortness of breath, nausea, diaphoresis, fever or chills. There is a baseline cough which is nonproductive. He denies dysuria or change in bowel habits. He did not take anything for pain prior to arrival.  He is on Brilinta. There are no other complaints, changes, or physical findings at this time. PCP: Corbin Jain MD    No current facility-administered medications on file prior to encounter. Current Outpatient Medications on File Prior to Encounter   Medication Sig Dispense Refill    aspirin 81 mg chewable tablet Take 1 Tablet by mouth daily. 60 Tablet 0    furosemide (LASIX) 40 mg tablet Take daily 60 Tablet 0    metoprolol tartrate (LOPRESSOR) 25 mg tablet Take 1 Tablet by mouth two (2) times a day. 60 Tablet 1    rosuvastatin (CRESTOR) 20 mg tablet Take 1 Tablet by mouth nightly.  30 Tablet 1    ticagrelor (BRILINTA) 90 mg tablet Take 1 Tablet by mouth every twelve (12) hours every twelve (12) hours. 60 Tablet 2    omeprazole (PRILOSEC) 20 mg capsule Take 20 mg by mouth daily.  ascorbic acid, vitamin C, (Vitamin C) 500 mg tablet Take 1,000 mg by mouth daily.  olmesartan (BENICAR) 20 mg tablet Take 20 mg by mouth daily. Past History     Past Medical History:  Past Medical History:   Diagnosis Date    Hypertension        Past Surgical History:  Past Surgical History:   Procedure Laterality Date    MO ABDOMEN SURGERY PROC UNLISTED      hernia       Family History:  Family History   Problem Relation Age of Onset    Heart Attack Mother        Social History:  Social History     Tobacco Use    Smoking status: Former Smoker     Packs/day: 1.00     Quit date: 2019     Years since quittin.0   Substance Use Topics    Alcohol use: No    Drug use: Yes     Types: Prescription       Allergies:  No Known Allergies      Review of Systems   Review of Systems   Constitutional: Negative for fever. HENT: Negative for congestion. Eyes: Negative. Respiratory: Negative for shortness of breath. Cardiovascular: Positive for chest pain. Gastrointestinal: Positive for abdominal pain. Endocrine: Negative for heat intolerance. Genitourinary: Positive for flank pain. Musculoskeletal: Positive for back pain. Skin: Negative for rash. Allergic/Immunologic: Negative for immunocompromised state. Neurological: Negative for dizziness. Hematological: Does not bruise/bleed easily. Psychiatric/Behavioral: Negative. All other systems reviewed and are negative. Physical Exam   Physical Exam  Vitals and nursing note reviewed. Constitutional:       General: He is not in acute distress. Appearance: He is well-developed. HENT:      Head: Normocephalic and atraumatic. Cardiovascular:      Rate and Rhythm: Normal rate and regular rhythm. Heart sounds: Normal heart sounds.    Pulmonary:      Effort: Pulmonary effort is normal.      Breath sounds: Normal breath sounds. Abdominal:      General: Bowel sounds are normal.      Palpations: Abdomen is soft. Tenderness: There is abdominal tenderness in the right lower quadrant, epigastric area and left lower quadrant. Musculoskeletal:      Cervical back: Normal range of motion. Skin:     General: Skin is warm and dry. Neurological:      General: No focal deficit present. Mental Status: He is alert and oriented to person, place, and time. Coordination: Coordination normal.   Psychiatric:         Mood and Affect: Mood normal.         Behavior: Behavior normal.         Diagnostic Study Results     Labs -     Recent Results (from the past 12 hour(s))   EKG, 12 LEAD, INITIAL    Collection Time: 09/02/21  4:11 PM   Result Value Ref Range    Ventricular Rate 77 BPM    Atrial Rate 77 BPM    P-R Interval 178 ms    QRS Duration 90 ms    Q-T Interval 414 ms    QTC Calculation (Bezet) 468 ms    Calculated P Axis 57 degrees    Calculated R Axis -2 degrees    Calculated T Axis 101 degrees    Diagnosis       Normal sinus rhythm  Anterior infarct , age undetermined    When compared with ECG of 20-AUG-2021 06:25,  T wave inversion now evident in Lateral leads  Confirmed by Grisel Addison ([id number]) on 9/2/2021 6:25:13 PM     CBC WITH AUTOMATED DIFF    Collection Time: 09/02/21  4:14 PM   Result Value Ref Range    WBC 11.9 (H) 4.1 - 11.1 K/uL    RBC 5.80 (H) 4.10 - 5.70 M/uL    HGB 16.3 12.1 - 17.0 g/dL    HCT 50.0 36.6 - 50.3 %    MCV 86.2 80.0 - 99.0 FL    MCH 28.1 26.0 - 34.0 PG    MCHC 32.6 30.0 - 36.5 g/dL    RDW 13.5 11.5 - 14.5 %    PLATELET 384 243 - 583 K/uL    MPV 9.5 8.9 - 12.9 FL    NRBC 0.0 0  WBC    ABSOLUTE NRBC 0.00 0.00 - 0.01 K/uL    NEUTROPHILS 75 32 - 75 %    LYMPHOCYTES 15 12 - 49 %    MONOCYTES 6 5 - 13 %    EOSINOPHILS 2 0 - 7 %    BASOPHILS 1 0 - 1 %    IMMATURE GRANULOCYTES 1 (H) 0.0 - 0.5 %    ABS. NEUTROPHILS 8.9 (H) 1.8 - 8.0 K/UL    ABS. LYMPHOCYTES 1.8 0.8 - 3.5 K/UL    ABS. MONOCYTES 0.8 0.0 - 1.0 K/UL    ABS. EOSINOPHILS 0.3 0.0 - 0.4 K/UL    ABS. BASOPHILS 0.1 0.0 - 0.1 K/UL    ABS. IMM. GRANS. 0.1 (H) 0.00 - 0.04 K/UL    DF AUTOMATED     METABOLIC PANEL, COMPREHENSIVE    Collection Time: 09/02/21  4:14 PM   Result Value Ref Range    Sodium 139 136 - 145 mmol/L    Potassium 4.1 3.5 - 5.1 mmol/L    Chloride 102 97 - 108 mmol/L    CO2 29 21 - 32 mmol/L    Anion gap 8 5 - 15 mmol/L    Glucose 92 65 - 100 mg/dL    BUN 14 6 - 20 MG/DL    Creatinine 1.12 0.70 - 1.30 MG/DL    BUN/Creatinine ratio 13 12 - 20      GFR est AA >60 >60 ml/min/1.73m2    GFR est non-AA >60 >60 ml/min/1.73m2    Calcium 9.5 8.5 - 10.1 MG/DL    Bilirubin, total 0.4 0.2 - 1.0 MG/DL    ALT (SGPT) 68 12 - 78 U/L    AST (SGOT) 39 (H) 15 - 37 U/L    Alk.  phosphatase 86 45 - 117 U/L    Protein, total 8.4 (H) 6.4 - 8.2 g/dL    Albumin 3.6 3.5 - 5.0 g/dL    Globulin 4.8 (H) 2.0 - 4.0 g/dL    A-G Ratio 0.8 (L) 1.1 - 2.2     TROPONIN I    Collection Time: 09/02/21  4:14 PM   Result Value Ref Range    Troponin-I, Qt. 0.05 (H) <0.05 ng/mL   LIPASE    Collection Time: 09/02/21  4:14 PM   Result Value Ref Range    Lipase 95 73 - 393 U/L   TYPE & SCREEN    Collection Time: 09/02/21  4:15 PM   Result Value Ref Range    Crossmatch Expiration 09/05/2021,2359     ABO/Rh(D) O POSITIVE     Antibody screen NEG    URINALYSIS W/ REFLEX CULTURE    Collection Time: 09/02/21  7:17 PM    Specimen: Urine   Result Value Ref Range    Color YELLOW/STRAW      Appearance CLOUDY (A) CLEAR      Specific gravity <1.005 1.003 - 1.030    pH (UA) 7.0 5.0 - 8.0      Protein Negative NEG mg/dL    Glucose Negative NEG mg/dL    Ketone Negative NEG mg/dL    Bilirubin Negative NEG      Blood LARGE (A) NEG      Urobilinogen 1.0 0.2 - 1.0 EU/dL    Nitrites Negative NEG      Leukocyte Esterase TRACE (A) NEG      WBC 0-4 0 - 4 /hpf    RBC >100 (H) 0 - 5 /hpf    Epithelial cells FEW FEW /lpf    Bacteria Negative NEG /hpf UA: UC IF INDICATED CULTURE NOT INDICATED BY UA RESULT CNI     TROPONIN I    Collection Time: 09/02/21  7:17 PM   Result Value Ref Range    Troponin-I, Qt. 0.07 (H) <0.05 ng/mL       Radiologic Studies -   CT ABD PELV W CONT   Final Result   1. Diffuse hepatic steatosis. 2. Contracted gallbladder with small gallstones. No biliary duct dilation. 3. Left renal bilobed cyst measuring 10 mm with eccentric focus of   calcification. Subcentimeter right renal hypoattenuating lesion which is too   small to characterize. Tiny nonobstructing interpolar right renal calculus. 4. No ureteral dilation. There are 2 dependent calculi in the urinary bladder,   the larger measuring 4 mm in size. 5. Mild-moderate prostatomegaly. 6. Small right and tiny left direct inguinal hernias. CT Results  (Last 48 hours)    None        CXR Results  (Last 48 hours)    None          Medical Decision Making   I am the first provider for this patient. I reviewed the vital signs, available nursing notes, past medical history, past surgical history, family history and social history. Vital Signs-Reviewed the patient's vital signs. Patient Vitals for the past 12 hrs:   Temp Pulse Resp BP SpO2   09/02/21 1628 -- -- -- (!) 132/93 97 %   09/02/21 1604 98.1 °F (36.7 °C) 76 18 125/80 99 %       EKG interpretation: (Preliminary)  Rhythm: normal sinus rhythm; and regular . Rate (approx.): 77; Axis: normal; PA interval: normal; QRS interval: normal ; ST/T wave: T wave inverted; Other findings: In the lateral leads. Records Reviewed: Nursing Notes, Old Medical Records, Previous electrocardiograms, Previous Radiology Studies and Previous Laboratory Studies    Provider Notes (Medical Decision Making):   Kidney stone, diverticulitis, urinary tract infection, atypical chest pain, peptic ulcer disease, CAD    ED Course:   Initial assessment performed.  The patients presenting problems have been discussed, and they are in agreement with the care plan formulated and outlined with them. I have encouraged them to ask questions as they arise throughout their visit. Consult note:    I discussed the patient's troponin results with , etiology. He thinks that is residual from the patient's myocardial infarction, and does not need the patient  to be admitted for that.      progress note: All of the patient's symptoms have resolved. His results of been reviewed. He is advised to follow-up and return to ER if worse                 Critical Care Time:   0    Disposition:  home    DISCHARGE PLAN:  1. Discharge Medication List as of 9/2/2021  8:45 PM        2. Follow-up Information     Follow up With Specialties Details Why Contact Info    Shaneka Waldrop MD Family Medicine  As needed [address]  [address] [id number]  [phone], Susannah Moulton MD Cardiology  As needed [address]  [id number]  [address] [facility]      Emmy Rudd MD Urology  As needed [address]  [phone]      [facility] EMERGENCY DEPT Emergency Medicine  If symptoms worsen [address] [place] [address]  [address] [facility]  [phone]        3. Return to ED if worse     Diagnosis     Clinical Impression:   1. Kidney stone    2. Diverticulosis    3. Atypical chest pain    4. Hematuria, unspecified type        Attestations:    Akosua Potter MD    Please note that this dictation was completed with [employer], the computer voice recognition software. Quite often unanticipated grammatical, syntax, homophones, and other interpretive errors are inadvertently transcribed by the computer software. Please disregard these errors. Please excuse any errors that have escaped final proofreading. Thank you.

## 2021-09-02 NOTE — ED NOTES
Assumed care of pt from triage, pt ambulatory and pt brother at bedside. Pt reports bright red blood in his urine about 2 hours ago. After the bloody urine pt experienced bilateral lower quadrant pain w/ radiation to L lower back/flank. Additionally, pt noting mild CP he describes as \"burning\" since arriving at ED. Pt had recent MI w/ stent placement on 8/18/2021    1815 Pt attempted to urinate w/o success.  Pt reporting pain unchanged, MD notified

## 2021-09-03 NOTE — ED NOTES
Patient was provided with discharge instructions. Instructions and any medications were reviewed with the patient &/or family by Dr Izzy Kahn. Questions and concerns addressed by the provider. Patient discharged in stable condition via ambulation and was escorted by family.

## 2021-09-13 ENCOUNTER — TELEPHONE (OUTPATIENT)
Dept: CARDIAC REHAB | Age: 61
End: 2021-09-13

## 2021-09-13 NOTE — TELEPHONE ENCOUNTER
Due to patients high co pay he states he wouldn't be able to participate in Cardiopulmonary Rehab. He was  informed that his referral is good for one year.  09/13  --Jack Sport

## 2021-10-14 ENCOUNTER — APPOINTMENT (OUTPATIENT)
Dept: GENERAL RADIOLOGY | Age: 61
End: 2021-10-14
Attending: EMERGENCY MEDICINE
Payer: COMMERCIAL

## 2021-10-14 ENCOUNTER — HOSPITAL ENCOUNTER (EMERGENCY)
Age: 61
Discharge: HOME | End: 2021-10-14
Attending: EMERGENCY MEDICINE
Payer: COMMERCIAL

## 2021-10-14 VITALS
SYSTOLIC BLOOD PRESSURE: 107 MMHG | TEMPERATURE: 98.1 F | HEIGHT: 69 IN | DIASTOLIC BLOOD PRESSURE: 72 MMHG | RESPIRATION RATE: 16 BRPM | BODY MASS INDEX: 31.44 KG/M2 | OXYGEN SATURATION: 95 % | HEART RATE: 56 BPM | WEIGHT: 212.3 LBS

## 2021-10-14 DIAGNOSIS — E87.6 HYPOKALEMIA: ICD-10-CM

## 2021-10-14 DIAGNOSIS — R07.9 ACUTE CHEST PAIN: Primary | ICD-10-CM

## 2021-10-14 LAB
ALBUMIN SERPL-MCNC: 3.9 G/DL (ref 3.5–5)
ALBUMIN/GLOB SERPL: 1 {RATIO} (ref 1.1–2.2)
ALP SERPL-CCNC: 65 U/L (ref 45–117)
ALT SERPL-CCNC: 72 U/L (ref 12–78)
ANION GAP SERPL CALC-SCNC: 3 MMOL/L (ref 5–15)
AST SERPL-CCNC: 40 U/L (ref 15–37)
BASOPHILS # BLD: 0.1 K/UL (ref 0–0.1)
BASOPHILS NFR BLD: 1 % (ref 0–1)
BILIRUB SERPL-MCNC: 0.6 MG/DL (ref 0.2–1)
BUN SERPL-MCNC: 12 MG/DL (ref 6–20)
BUN/CREAT SERPL: 13 (ref 12–20)
CALCIUM SERPL-MCNC: 9.6 MG/DL (ref 8.5–10.1)
CHLORIDE SERPL-SCNC: 106 MMOL/L (ref 97–108)
CO2 SERPL-SCNC: 28 MMOL/L (ref 21–32)
CREAT SERPL-MCNC: 0.89 MG/DL (ref 0.7–1.3)
DIFFERENTIAL METHOD BLD: NORMAL
EOSINOPHIL # BLD: 0.4 K/UL (ref 0–0.4)
EOSINOPHIL NFR BLD: 5 % (ref 0–7)
ERYTHROCYTE [DISTWIDTH] IN BLOOD BY AUTOMATED COUNT: 14.1 % (ref 11.5–14.5)
GLOBULIN SER CALC-MCNC: 4 G/DL (ref 2–4)
GLUCOSE SERPL-MCNC: 158 MG/DL (ref 65–100)
HCT VFR BLD AUTO: 43 % (ref 36.6–50.3)
HGB BLD-MCNC: 14.6 G/DL (ref 12.1–17)
IMM GRANULOCYTES # BLD AUTO: 0 K/UL (ref 0–0.04)
IMM GRANULOCYTES NFR BLD AUTO: 0 % (ref 0–0.5)
LYMPHOCYTES # BLD: 1.3 K/UL (ref 0.8–3.5)
LYMPHOCYTES NFR BLD: 18 % (ref 12–49)
MCH RBC QN AUTO: 29 PG (ref 26–34)
MCHC RBC AUTO-ENTMCNC: 34 G/DL (ref 30–36.5)
MCV RBC AUTO: 85.3 FL (ref 80–99)
MONOCYTES # BLD: 0.8 K/UL (ref 0–1)
MONOCYTES NFR BLD: 10 % (ref 5–13)
NEUTS SEG # BLD: 5 K/UL (ref 1.8–8)
NEUTS SEG NFR BLD: 66 % (ref 32–75)
NRBC # BLD: 0 K/UL (ref 0–0.01)
NRBC BLD-RTO: 0 PER 100 WBC
PLATELET # BLD AUTO: 240 K/UL (ref 150–400)
PMV BLD AUTO: 9.3 FL (ref 8.9–12.9)
POTASSIUM SERPL-SCNC: 3.4 MMOL/L (ref 3.5–5.1)
PROT SERPL-MCNC: 7.9 G/DL (ref 6.4–8.2)
RBC # BLD AUTO: 5.04 M/UL (ref 4.1–5.7)
SODIUM SERPL-SCNC: 137 MMOL/L (ref 136–145)
TROPONIN-HIGH SENSITIVITY: 22 NG/L (ref 0–76)
TROPONIN-HIGH SENSITIVITY: 25 NG/L (ref 0–76)
WBC # BLD AUTO: 7.6 K/UL (ref 4.1–11.1)

## 2021-10-14 PROCEDURE — 74011250636 HC RX REV CODE- 250/636: Performed by: EMERGENCY MEDICINE

## 2021-10-14 PROCEDURE — 99284 EMERGENCY DEPT VISIT MOD MDM: CPT

## 2021-10-14 PROCEDURE — 36415 COLL VENOUS BLD VENIPUNCTURE: CPT

## 2021-10-14 PROCEDURE — 93005 ELECTROCARDIOGRAM TRACING: CPT

## 2021-10-14 PROCEDURE — 96361 HYDRATE IV INFUSION ADD-ON: CPT

## 2021-10-14 PROCEDURE — 96376 TX/PRO/DX INJ SAME DRUG ADON: CPT

## 2021-10-14 PROCEDURE — 74011250637 HC RX REV CODE- 250/637: Performed by: EMERGENCY MEDICINE

## 2021-10-14 PROCEDURE — 96374 THER/PROPH/DIAG INJ IV PUSH: CPT

## 2021-10-14 PROCEDURE — 71045 X-RAY EXAM CHEST 1 VIEW: CPT

## 2021-10-14 PROCEDURE — 96375 TX/PRO/DX INJ NEW DRUG ADDON: CPT

## 2021-10-14 PROCEDURE — 80053 COMPREHEN METABOLIC PANEL: CPT

## 2021-10-14 PROCEDURE — 84484 ASSAY OF TROPONIN QUANT: CPT

## 2021-10-14 PROCEDURE — 85025 COMPLETE CBC W/AUTO DIFF WBC: CPT

## 2021-10-14 RX ORDER — POTASSIUM CHLORIDE 750 MG/1
40 TABLET, FILM COATED, EXTENDED RELEASE ORAL
Status: COMPLETED | OUTPATIENT
Start: 2021-10-14 | End: 2021-10-14

## 2021-10-14 RX ORDER — MORPHINE SULFATE 2 MG/ML
2 INJECTION, SOLUTION INTRAMUSCULAR; INTRAVENOUS
Status: COMPLETED | OUTPATIENT
Start: 2021-10-14 | End: 2021-10-14

## 2021-10-14 RX ORDER — METHOCARBAMOL 750 MG/1
750 TABLET, FILM COATED ORAL
Qty: 20 TABLET | Refills: 0 | Status: SHIPPED | OUTPATIENT
Start: 2021-10-14

## 2021-10-14 RX ORDER — ONDANSETRON 2 MG/ML
4 INJECTION INTRAMUSCULAR; INTRAVENOUS
Status: COMPLETED | OUTPATIENT
Start: 2021-10-14 | End: 2021-10-14

## 2021-10-14 RX ADMIN — POTASSIUM CHLORIDE 40 MEQ: 750 TABLET, FILM COATED, EXTENDED RELEASE ORAL at 18:58

## 2021-10-14 RX ADMIN — MORPHINE SULFATE 2 MG: 2 INJECTION, SOLUTION INTRAMUSCULAR; INTRAVENOUS at 18:12

## 2021-10-14 RX ADMIN — SODIUM CHLORIDE 250 ML: 9 INJECTION, SOLUTION INTRAVENOUS at 19:31

## 2021-10-14 RX ADMIN — MORPHINE SULFATE 2 MG: 2 INJECTION, SOLUTION INTRAMUSCULAR; INTRAVENOUS at 19:24

## 2021-10-14 RX ADMIN — ONDANSETRON 4 MG: 2 INJECTION INTRAMUSCULAR; INTRAVENOUS at 18:12

## 2021-10-14 NOTE — ED NOTES
Report received from [facility]. They advised of the patient's chief complaint, current status, orders completed (to include IV access/medications/radiology testing), outstanding orders that still need to be completed, and the treatment plan. Questions asked and answered prior to assumption of care.

## 2021-10-14 NOTE — ED NOTES
Assumed care of pt from triage. Pt is A&O x 4. Pt reports CC of chest pain. Pt had a widowmaker heart attack 6 weeks ago and a stent placed. Pt came from [facility] where he was doing a stress test. Pt states he did not even get 8 minutes in on the treadmill before the pain started. Pt claims after the first few minutes of walking he was okay, then he had to start jogging-\"trot\". Pt described the pain as a pressure, staff gave pt one nitro and pain did not subside. Pt brother at bedside and placed on the monitor x3 with call bell in reach, VSS at this time. 1830: Pt medicated at this time per orders. Pt grabbing chest describing chest pain started again as a stabbing feeling at 8/10. Dr. Heidy Shelton notified. No new orders at this time. TRANSFER - IN REPORT:    Verbal/bedside report given to Farhad Haq on [employer]. Report consisted of patients Situation, Background, Assessment and   Recommendations(SBAR). Information from the following report(s) SBAR and ED Summary was reviewed with the receiving nurse. Opportunity for questions and clarification was provided.

## 2021-10-15 LAB
ATRIAL RATE: 62 BPM
CALCULATED P AXIS, ECG09: 24 DEGREES
CALCULATED R AXIS, ECG10: -8 DEGREES
CALCULATED T AXIS, ECG11: 39 DEGREES
DIAGNOSIS, 93000: NORMAL
P-R INTERVAL, ECG05: 192 MS
Q-T INTERVAL, ECG07: 408 MS
QRS DURATION, ECG06: 84 MS
QTC CALCULATION (BEZET), ECG08: 414 MS
VENTRICULAR RATE, ECG03: 62 BPM

## 2021-10-15 NOTE — ED PROVIDER NOTES
EMERGENCY DEPARTMENT HISTORY AND PHYSICAL EXAM      Date: 10/14/2021  Patient Name: Sammie Kaplan    History of Presenting Illness     Chief Complaint   Patient presents with    Chest Pain     sent over after experiencing chest pain during a stress test       History Provided By: Patient    HPI: Sammie Kaplan, 57 y.o. male presents to the ED with cc of pain. Patient has a history of an NSTEMI in August 2021. He also has ischemic cardiomyopathy. He was at his cardiologist office today doing a stress test, when he developed chest pain. Pain is 6 out of 10 in severity when it is present. It is located in the left chest, and does not radiate to the neck, back, jaw or arms. It is associated with diaphoresis but no nausea or shortness of breath. Patient states that this does not feel similar to his cardiac pain. States that was severe. This is more of a pressure sensation. He denies leg pain, leg edema, fever or chills. He has had a cough for several days which is productive of clear sputum. He received nitro en route, but he does not believe that helped his pain. Pain lasts for minutes at a time when it is present. There are no other complaints, changes, or physical findings at this time. PCP: Raisa Zelaya MD    No current facility-administered medications on file prior to encounter. Current Outpatient Medications on File Prior to Encounter   Medication Sig Dispense Refill    aspirin 81 mg chewable tablet Take 1 Tablet by mouth daily. 60 Tablet 0    furosemide (LASIX) 40 mg tablet Take daily 60 Tablet 0    metoprolol tartrate (LOPRESSOR) 25 mg tablet Take 1 Tablet by mouth two (2) times a day. 60 Tablet 1    rosuvastatin (CRESTOR) 20 mg tablet Take 1 Tablet by mouth nightly. 30 Tablet 1    ticagrelor (BRILINTA) 90 mg tablet Take 1 Tablet by mouth every twelve (12) hours every twelve (12) hours. 60 Tablet 2    omeprazole (PRILOSEC) 20 mg capsule Take 20 mg by mouth daily.       ascorbic acid, vitamin C, (Vitamin C) 500 mg tablet Take 1,000 mg by mouth daily.  olmesartan (BENICAR) 20 mg tablet Take 20 mg by mouth daily. Past History     Past Medical History:  Past Medical History:   Diagnosis Date    Hypertension        Past Surgical History:  Past Surgical History:   Procedure Laterality Date    KS ABDOMEN SURGERY PROC UNLISTED      hernia       Family History:  Family History   Problem Relation Age of Onset    Heart Attack Mother        Social History:  Social History     Tobacco Use    Smoking status: Former Smoker     Packs/day: 1.00     Quit date: 2019     Years since quittin.1   Substance Use Topics    Alcohol use: No    Drug use: Yes     Types: Prescription       Allergies:  No Known Allergies      Review of Systems   Review of Systems   Constitutional: Negative for fever. HENT: Negative for congestion. Eyes: Negative. Respiratory: Negative for shortness of breath. Cardiovascular: Positive for chest pain. Gastrointestinal: Negative for abdominal pain. Endocrine: Negative for heat intolerance. Genitourinary: Negative. Musculoskeletal: Negative for back pain. Skin: Negative for rash. Allergic/Immunologic: Negative for immunocompromised state. Neurological: Negative for dizziness. Hematological: Does not bruise/bleed easily. Psychiatric/Behavioral: Negative. All other systems reviewed and are negative. Physical Exam   Physical Exam  Vitals and nursing note reviewed. Constitutional:       General: He is not in acute distress. Appearance: He is well-developed. HENT:      Head: Normocephalic and atraumatic. Cardiovascular:      Rate and Rhythm: Normal rate and regular rhythm. Heart sounds: Normal heart sounds. Pulmonary:      Effort: Pulmonary effort is normal.      Breath sounds: Normal breath sounds. Chest:      Chest wall: No tenderness.    Abdominal:      General: Bowel sounds are normal.      Palpations: Abdomen is soft. Musculoskeletal:         General: Normal range of motion. Cervical back: Normal range of motion. Skin:     General: Skin is warm and dry. Neurological:      General: No focal deficit present. Mental Status: He is alert and oriented to person, place, and time. Coordination: Coordination normal.   Psychiatric:         Mood and Affect: Mood normal.         Diagnostic Study Results     Labs -     Recent Results (from the past 12 hour(s))   CBC WITH AUTOMATED DIFF    Collection Time: 10/14/21  4:41 PM   Result Value Ref Range    WBC 7.6 4.1 - 11.1 K/uL    RBC 5.04 4.10 - 5.70 M/uL    HGB 14.6 12.1 - 17.0 g/dL    HCT 43.0 36.6 - 50.3 %    MCV 85.3 80.0 - 99.0 FL    MCH 29.0 26.0 - 34.0 PG    MCHC 34.0 30.0 - 36.5 g/dL    RDW 14.1 11.5 - 14.5 %    PLATELET 791 675 - 869 K/uL    MPV 9.3 8.9 - 12.9 FL    NRBC 0.0 0  WBC    ABSOLUTE NRBC 0.00 0.00 - 0.01 K/uL    NEUTROPHILS 66 32 - 75 %    LYMPHOCYTES 18 12 - 49 %    MONOCYTES 10 5 - 13 %    EOSINOPHILS 5 0 - 7 %    BASOPHILS 1 0 - 1 %    IMMATURE GRANULOCYTES 0 0.0 - 0.5 %    ABS. NEUTROPHILS 5.0 1.8 - 8.0 K/UL    ABS. LYMPHOCYTES 1.3 0.8 - 3.5 K/UL    ABS. MONOCYTES 0.8 0.0 - 1.0 K/UL    ABS. EOSINOPHILS 0.4 0.0 - 0.4 K/UL    ABS. BASOPHILS 0.1 0.0 - 0.1 K/UL    ABS. IMM. GRANS. 0.0 0.00 - 0.04 K/UL    DF AUTOMATED     METABOLIC PANEL, COMPREHENSIVE    Collection Time: 10/14/21  4:41 PM   Result Value Ref Range    Sodium 137 136 - 145 mmol/L    Potassium 3.4 (L) 3.5 - 5.1 mmol/L    Chloride 106 97 - 108 mmol/L    CO2 28 21 - 32 mmol/L    Anion gap 3 (L) 5 - 15 mmol/L    Glucose 158 (H) 65 - 100 mg/dL    BUN 12 6 - 20 MG/DL    Creatinine 0.89 0.70 - 1.30 MG/DL    BUN/Creatinine ratio 13 12 - 20      GFR est AA >60 >60 ml/min/1.73m2    GFR est non-AA >60 >60 ml/min/1.73m2    Calcium 9.6 8.5 - 10.1 MG/DL    Bilirubin, total 0.6 0.2 - 1.0 MG/DL    ALT (SGPT) 72 12 - 78 U/L    AST (SGOT) 40 (H) 15 - 37 U/L    Alk.  phosphatase 65 45 - 117 U/L    Protein, total 7.9 6.4 - 8.2 g/dL    Albumin 3.9 3.5 - 5.0 g/dL    Globulin 4.0 2.0 - 4.0 g/dL    A-G Ratio 1.0 (L) 1.1 - 2.2     TROPONIN-HIGH SENSITIVITY    Collection Time: 10/14/21  4:41 PM   Result Value Ref Range    Troponin-High Sensitivity 22 0 - 76 ng/L   EKG, 12 LEAD, INITIAL    Collection Time: 10/14/21  4:47 PM   Result Value Ref Range    Ventricular Rate 62 BPM    Atrial Rate 62 BPM    P-R Interval 192 ms    QRS Duration 84 ms    Q-T Interval 408 ms    QTC Calculation (Bezet) 414 ms    Calculated P Axis 24 degrees    Calculated R Axis -8 degrees    Calculated T Axis 39 degrees    Diagnosis       Normal sinus rhythm  Normal ECG  When compared with ECG of 02-SEP-2021 16:11,  T wave inversion no longer evident in Anterolateral leads  QT has shortened     TROPONIN-HIGH SENSITIVITY    Collection Time: 10/14/21  6:46 PM   Result Value Ref Range    Troponin-High Sensitivity 25 0 - 76 ng/L       Radiologic Studies -   XR CHEST PORT   Final Result   No acute cardiopulmonary disease. CT Results  (Last 48 hours)    None        CXR Results  (Last 48 hours)               10/14/21 1812  XR CHEST PORT Final result    Impression:  No acute cardiopulmonary disease. Narrative:  INDICATION: Chest pain. Portable AP semi-upright view of the chest.       Direct comparison made to prior chest x-ray dated August 2021. Cardiomediastinal silhouette is stable. Lungs are clear bilaterally. Pleural   spaces are normal and there is no pneumothorax. Osseous structures are intact. Medical Decision Making   I am the first provider for this patient. I reviewed the vital signs, available nursing notes, past medical history, past surgical history, family history and social history. Vital Signs-Reviewed the patient's vital signs.   Patient Vitals for the past 12 hrs:   Temp Pulse Resp BP SpO2   10/14/21 1900 -- (!) 56 16 107/72 95 %   10/14/21 1628 98.1 °F (36.7 °C) 64 16 112/69 95 %       EKG interpretation: (Preliminary)  Rhythm: normal sinus rhythm; and regular . Rate (approx.): 62; Axis: normal; CO interval: normal; QRS interval: normal ; ST/T wave: normal; Other findings: normal.    Records Reviewed: Nursing Notes, Old Medical Records, Previous electrocardiograms, Previous Radiology Studies and Previous Laboratory Studies    Provider Notes (Medical Decision Making): Atypical chest pain, CAD, ACS, musculoskeletal, reflux    ED Course:   Initial assessment performed. The patients presenting problems have been discussed, and they are in agreement with the care plan formulated and outlined with them. I have encouraged them to ask questions as they arise throughout their visit. Consult note:    I discussed the case with Dr. Payal Funk, cardiology. He states that if the patient's enzymes are unremarkable, there are no EKG changes, and the patient's pain is under control, he can be discharged and they will schedule Lexiscan. Progress note:    Patient is feeling better. His results of been reviewed. He is advised to follow-up and return to ER if worse           Critical Care Time:   0    Disposition:  home    DISCHARGE PLAN:  1. Discharge Medication List as of 10/14/2021  8:11 PM      START taking these medications    Details   methocarbamoL (ROBAXIN) 750 mg tablet Take 1 Tablet by mouth four (4) times daily as needed for Muscle Spasm(s). , Normal, Disp-20 Tablet, R-0         CONTINUE these medications which have NOT CHANGED    Details   aspirin 81 mg chewable tablet Take 1 Tablet by mouth daily. , Normal, Disp-60 Tablet, R-0      furosemide (LASIX) 40 mg tablet Take daily, Normal, Disp-60 Tablet, R-0      metoprolol tartrate (LOPRESSOR) 25 mg tablet Take 1 Tablet by mouth two (2) times a day., Normal, Disp-60 Tablet, R-1      rosuvastatin (CRESTOR) 20 mg tablet Take 1 Tablet by mouth nightly., Normal, Disp-30 Tablet, R-1      ticagrelor (BRILINTA) 90 mg tablet Take 1 Tablet by mouth every twelve (12) hours every twelve (12) hours. , Normal, Disp-60 Tablet, R-2      omeprazole (PRILOSEC) 20 mg capsule Take 20 mg by mouth daily. , Historical Med      ascorbic acid, vitamin C, (Vitamin C) 500 mg tablet Take 1,000 mg by mouth daily. , Historical Med      olmesartan (BENICAR) 20 mg tablet 20 mg, Oral, DAILY, Until Discontinued, Historical Med           2. Follow-up Information     Follow up With Specialties Details Why Contact Info    Miranda Blakely MD Family Medicine  As needed [address]  [address] [address]  [phone], Caro Diaz MD Cardiology Call in 1 day To be scheduled for a Lexiscan [address]  [id number]  [address] [phone]      [facility] Emergency Medicine  If symptoms worsen [address]  [address]  [phone]        3. Return to ED if worse     Diagnosis     Clinical Impression:   1. Acute chest pain    2. Hypokalemia        Attestations:    Lilliam Barajas MD    Please note that this dictation was completed with [employer] the [employer] [place] voice recognition software. Quite often unanticipated grammatical, syntax, homophones, and other interpretive errors are inadvertently transcribed by the computer software. Please disregard these errors. Please excuse any errors that have escaped final proofreading. Thank you.

## 2021-10-15 NOTE — ED NOTES
Patient was provided with discharge instructions. Instructions and any medications were reviewed with the patient &/or family by Dr Lamberto Ponce. Questions and concerns addressed by the provider. Patient discharged in stable condition via ambulation and was escorted by brother.

## 2022-03-29 NOTE — ED NOTES
Per cardiologist, nitro drip titrated to max dose of 20 mcg/min. Pt's current BP is 111/92, floor Nurse Ruben Reddy will administer metoprolol upstairs based on Pt's BP. Patient is being transferred to [place] [address]. # [id number]. Report given to SCARLET cleaning on [facility] for routine progression of care. Report consisted of the following information SBAR, Kardex, ED Summary, MAR, Recent Results and Cardiac Rhythm sinus rythym. Patient transferred to receiving unit by: RN (RN or tech name). Outstanding consults needed: No     Next labs due: No     The following personal items will be sent with the patient during transfer to the floor:     All valuables:    Cardiac monitoring ordered: Yes    The following CURRENT information was reported to the receiving RN:    Code status: Full Code at time of transfer    Last set of vital signs:  Vital Signs  Level of Consciousness: Alert (0) (08/19/21 2220)  Temp: 98.1 °F (36.7 °C) (08/19/21 2220)  Temp Source: Oral (08/19/21 2220)  Pulse (Heart Rate): 87 (08/19/21 2220)  Heart Rate Source: Monitor (08/19/21 1908)  Resp Rate: 18 (08/19/21 2220)  BP: (!) 111/92 (08/19/21 2220)  MAP (Monitor): 86 (08/19/21 2130)  MAP (Calculated): 98 (08/19/21 2220)  MEWS Score: 1 (08/19/21 2220)         Oxygen Therapy  O2 Sat (%): 94 % (08/19/21 2220)  Pulse via Oximetry: 83 beats per minute (08/19/21 2130)      Last pain assessment:  Pain 1  Pain Scale 1: Numeric (0 - 10)  Pain Intensity 1: 3  Patient Stated Pain Goal: 0  Pain Reassessment 1: Yes  Pain Onset 1: today  Pain Location 1: Chest, Shoulder  Pain Orientation 1: Anterior, Left  Pain Description 1: Constant  Pain Intervention(s) 1: Therapeutic presence      Wounds: No     Urinary catheter: voiding  Is there a vee order: No     LDAs:       Peripheral IV 08/19/21 Left Antecubital (Active)   Site Assessment Clean, dry, & intact 08/19/21 1912   Phlebitis Assessment 0 08/19/21 1912   Infiltration Assessment 0 08/19/21 1912   Dressing Type Transparent TRANSFER - IN REPORT:    Verbal report received from Audrey Conklin on [place] [place]  being received from [facility] for change in patient condition(ICU level care needed)      Report consisted of patients Situation, Background, Assessment and   Recommendations(SBAR). Information from the following report(s) SBAR, Kardex, ED Summary, MAR and Med Rec Status was reviewed with the receiving nurse. Opportunity for questions and clarification was provided. Assessment completed upon patients arrival to unit and care assumed. 08/19/21 1912   Hub Color/Line Status Green;Flushed;Patent 08/19/21 1912       Peripheral IV 08/19/21 Right Antecubital (Active)   Site Assessment Clean, dry, & intact 08/19/21 1912   Phlebitis Assessment 0 08/19/21 1912   Infiltration Assessment 0 08/19/21 1912   Dressing Status Clean, dry, & intact 08/19/21 1912   Dressing Type Transparent 08/19/21 1912   Hub Color/Line Status Pink;Flushed;Patent 08/19/21 1912         Opportunity for questions and clarification was provided.     Freida Strong

## 2022-07-21 ENCOUNTER — TRANSCRIBE ORDER (OUTPATIENT)
Dept: SCHEDULING | Age: 62
End: 2022-07-21

## 2022-07-21 DIAGNOSIS — K57.92 DIVERTICULITIS: Primary | ICD-10-CM

## (undated) DEVICE — SYRINGE ANGIO 10 CC BRL STD PRNT POLYCARB LT BLU MEDALLION

## (undated) DEVICE — COPILOT BLEEDBACK CONTROL VALVE: Brand: COPILOT

## (undated) DEVICE — DRAPE PRB US TRNSDCR 6X96IN --

## (undated) DEVICE — TUBING PRSS MON L6IN PVC M FEM CONN

## (undated) DEVICE — IRRIGATION KT PIST SYR 60ML -- CONVERT TO ITEM 116415

## (undated) DEVICE — CATH GUID COR EB35 6FR 100CM -- LAUNCHER

## (undated) DEVICE — HEART CATH-MRMC: Brand: MEDLINE INDUSTRIES, INC.

## (undated) DEVICE — 3M™ TEGADERM™ TRANSPARENT FILM DRESSING FRAME STYLE, 1626W, 4 IN X 4-3/4 IN (10 CM X 12 CM), 50/CT 4CT/CASE: Brand: 3M™ TEGADERM™

## (undated) DEVICE — CATHETER THROMCTMY L140CM RX L LUMN ASPIR TBNG INDIGO

## (undated) DEVICE — RUNTHROUGH NS EXTRA FLOPPY PTCA GUIDEWIRE: Brand: RUNTHROUGH

## (undated) DEVICE — GUIDEWIRE VASC L260CM 0.035IN J TIP L3MM PTFE FIX COR NAMIC

## (undated) DEVICE — RADIFOCUS OPTITORQUE ANGIOGRAPHIC CATHETER: Brand: OPTITORQUE

## (undated) DEVICE — TREK CORONARY DILATATION CATHETER 4.0 MM X 15 MM / RAPID-EXCHANGE: Brand: TREK

## (undated) DEVICE — CATHETER ANGIO JL4 AD 5 FRX100 CM PERFORMA

## (undated) DEVICE — TR BAND RADIAL ARTERY COMPRESSION DEVICE: Brand: TR BAND

## (undated) DEVICE — CATHETER ANGIO JR4 AD 5 FRX100 CM 25 CM PERFORMA

## (undated) DEVICE — CATH BLLN DIL 3.5X 15 RX -- EUPHORA

## (undated) DEVICE — ASSEMBLY CANSTR ENGINE -- INDIGO ASPIRATION SYSTEM

## (undated) DEVICE — CORONARY IMAGING CATHETER: Brand: OPTICROSS™ 6 HD

## (undated) DEVICE — CATH ANGI BLLN DIL 4.50X15MM -- NC EUPHORA

## (undated) DEVICE — SPLINT WR POS F/ARTERIAL ACC -- BX/10

## (undated) DEVICE — GLIDESHEATH SLENDER ACCESS KIT: Brand: GLIDESHEATH SLENDER